# Patient Record
Sex: MALE | Race: OTHER | Employment: FULL TIME | URBAN - METROPOLITAN AREA
[De-identification: names, ages, dates, MRNs, and addresses within clinical notes are randomized per-mention and may not be internally consistent; named-entity substitution may affect disease eponyms.]

---

## 2017-01-03 ENCOUNTER — PATIENT OUTREACH (OUTPATIENT)
Dept: INTERNAL MEDICINE CLINIC | Facility: CLINIC | Age: 67
End: 2017-01-03

## 2017-02-24 ENCOUNTER — PATIENT OUTREACH (OUTPATIENT)
Dept: INTERNAL MEDICINE CLINIC | Facility: CLINIC | Age: 67
End: 2017-02-24

## 2017-03-07 NOTE — PROGRESS NOTES
Unable to reach pt at home phone and work number is general phone for [place]. [address] sent. PLAN:  Follow up 1 month.

## 2017-03-12 RX ORDER — ROSUVASTATIN CALCIUM 5 MG/1
TABLET, COATED ORAL
Qty: 90 TABLET | Refills: 3 | Status: SHIPPED | OUTPATIENT
Start: 2017-03-12 | End: 2018-02-14

## 2017-03-17 RX ORDER — VALSARTAN 320 MG/1
TABLET ORAL
Qty: 90 TABLET | Refills: 3 | Status: SHIPPED | OUTPATIENT
Start: 2017-03-17 | End: 2018-03-13

## 2017-10-04 RX ORDER — AMLODIPINE BESYLATE 10 MG/1
TABLET ORAL
Qty: 30 TABLET | Refills: 0 | Status: SHIPPED | OUTPATIENT
Start: 2017-10-04 | End: 2017-11-03

## 2017-10-14 ENCOUNTER — LAB ENCOUNTER (OUTPATIENT)
Dept: LAB | Age: 67
End: 2017-10-14
Attending: INTERNAL MEDICINE
Payer: COMMERCIAL

## 2017-10-14 ENCOUNTER — OFFICE VISIT (OUTPATIENT)
Dept: INTERNAL MEDICINE CLINIC | Facility: CLINIC | Age: 67
End: 2017-10-14

## 2017-10-14 VITALS
HEART RATE: 56 BPM | TEMPERATURE: 99 F | BODY MASS INDEX: 29 KG/M2 | DIASTOLIC BLOOD PRESSURE: 77 MMHG | WEIGHT: 214 LBS | SYSTOLIC BLOOD PRESSURE: 148 MMHG

## 2017-10-14 DIAGNOSIS — D22.9 ATYPICAL MOLE: Primary | ICD-10-CM

## 2017-10-14 DIAGNOSIS — E78.2 MIXED HYPERLIPIDEMIA: ICD-10-CM

## 2017-10-14 DIAGNOSIS — F31.9 BIPOLAR 1 DISORDER (HCC): ICD-10-CM

## 2017-10-14 DIAGNOSIS — E11.9 DIABETES MELLITUS TYPE 2 WITHOUT RETINOPATHY (HCC): ICD-10-CM

## 2017-10-14 DIAGNOSIS — B35.1 ONYCHOMYCOSIS: ICD-10-CM

## 2017-10-14 DIAGNOSIS — E87.1 HYPONATREMIA: ICD-10-CM

## 2017-10-14 PROCEDURE — 99212 OFFICE O/P EST SF 10 MIN: CPT | Performed by: INTERNAL MEDICINE

## 2017-10-14 PROCEDURE — 36415 COLL VENOUS BLD VENIPUNCTURE: CPT

## 2017-10-14 PROCEDURE — 80156 ASSAY CARBAMAZEPINE TOTAL: CPT

## 2017-10-14 PROCEDURE — 84443 ASSAY THYROID STIM HORMONE: CPT

## 2017-10-14 PROCEDURE — 82043 UR ALBUMIN QUANTITATIVE: CPT

## 2017-10-14 PROCEDURE — 85025 COMPLETE CBC W/AUTO DIFF WBC: CPT

## 2017-10-14 PROCEDURE — 82570 ASSAY OF URINE CREATININE: CPT

## 2017-10-14 PROCEDURE — 83036 HEMOGLOBIN GLYCOSYLATED A1C: CPT

## 2017-10-14 PROCEDURE — 80053 COMPREHEN METABOLIC PANEL: CPT

## 2017-10-14 PROCEDURE — 80061 LIPID PANEL: CPT

## 2017-10-14 PROCEDURE — 99214 OFFICE O/P EST MOD 30 MIN: CPT | Performed by: INTERNAL MEDICINE

## 2017-10-14 RX ORDER — CLOTRIMAZOLE AND BETAMETHASONE DIPROPIONATE 10; .64 MG/G; MG/G
1 CREAM TOPICAL 2 TIMES DAILY PRN
Qty: 60 G | Refills: 2 | Status: SHIPPED | OUTPATIENT
Start: 2017-10-14 | End: 2019-06-08

## 2017-10-14 NOTE — PROGRESS NOTES
Zahraa Lopez is a 74year old male.   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   Jose C Xavier he might need Disp:  Rfl:    ONETOUCH ULTRA BLUE In Vitro Strip TEST SUGAR EVERY DAY Disp: 100 strip Rfl: 3   Fluticasone Propionate (FLONASE) 50 MCG/ACT Nasal Suspension 1 spray i each nostril every 12 hours Disp: 1 Bottle Rfl: 2   CarBAMazepine ER, Antipsych, (EQUETRO , right foot thickened toenails but not as yellow, no ulcers or calluses ,sensations grossly intact     ASSESSMENT AND PLAN:   Diagnoses and all orders for this visit:    Atypical mole  -     DERM - INTERNAL    Diabetes mellitus type 2 without retinopathy Labs reviewed 7/16 and new ones ordered   Flu shot from outside facility per pt   Come back for physical     The patient indicates understanding of these issues and agrees to the plan. Return in about 2 weeks (around 10/28/2017).

## 2017-10-14 NOTE — PATIENT INSTRUCTIONS
Understanding Carbohydrates, Fats, and Protein  Food is a source of fuel and nourishment for your body. It’s also a source of pleasure. Having diabetes doesn’t mean you have to eat special foods or give up desserts.  Instead, your dietitian can show you h · Polyunsaturated fats are mostly found in vegetable oils, such as corn, safflower, and soybean oils. They are also found in some seeds, nuts, and fish. Polyunsaturated fats lower LDL (unhealthy) cholesterol.  So, choosing them instead of saturated fats is Food is an important tool that you can use to control diabetes and stay healthy. Eating well-balanced meals in the correct amounts will help you control your blood glucose levels and prevent low blood sugar reactions.  It will also help you reduce the healt · Avoid added salt. It can contribute to high blood pressure, which can cause heart disease. People with diabetes already have a risk of high blood pressure and heart disease. · Stay at a healthy weight.  If you need to lose weight, cut down on your portio

## 2017-10-18 ENCOUNTER — TELEPHONE (OUTPATIENT)
Dept: OTHER | Age: 67
End: 2017-10-18

## 2017-10-18 NOTE — TELEPHONE ENCOUNTER
Unable to contact pt. Phone keeps ringing and then goes to busy signal. Other ph# general directory # to [facility] no VM left. [employer] message sent to pt for possible contact.

## 2017-10-18 NOTE — TELEPHONE ENCOUNTER
----- Message from Juice Marinelli RN sent at 10/18/2017  6:24 AM CDT -----      ----- Message -----  From: Bhavin Morton MD  Sent: 10/17/2017   2:34 PM  To:  Mehreen Polanco Lpn/Victoriano    Please call patient and let them know that the labsoverall are within normal l

## 2017-11-04 RX ORDER — AMLODIPINE BESYLATE 10 MG/1
TABLET ORAL
Qty: 90 TABLET | Refills: 0 | Status: SHIPPED | OUTPATIENT
Start: 2017-11-04 | End: 2018-01-30

## 2017-11-04 NOTE — TELEPHONE ENCOUNTER
Hypertensive Medications  Protocol Criteria:  · Appointment scheduled in the past 6 months or in the next 3 months  · BMP or CMP in the past 12 months  · Creatinine result < 2  Recent Outpatient Visits            3 weeks ago Lilliam mole    Guido Flores

## 2018-01-23 ENCOUNTER — OFFICE VISIT (OUTPATIENT)
Dept: INTERNAL MEDICINE CLINIC | Facility: CLINIC | Age: 68
End: 2018-01-23

## 2018-01-23 ENCOUNTER — APPOINTMENT (OUTPATIENT)
Dept: LAB | Age: 68
End: 2018-01-23
Attending: INTERNAL MEDICINE
Payer: COMMERCIAL

## 2018-01-23 VITALS
BODY MASS INDEX: 29.12 KG/M2 | WEIGHT: 215 LBS | SYSTOLIC BLOOD PRESSURE: 164 MMHG | DIASTOLIC BLOOD PRESSURE: 82 MMHG | HEIGHT: 72 IN | HEART RATE: 63 BPM

## 2018-01-23 DIAGNOSIS — E11.9 DIABETES MELLITUS TYPE 2 WITHOUT RETINOPATHY (HCC): ICD-10-CM

## 2018-01-23 DIAGNOSIS — F31.9 BIPOLAR 1 DISORDER (HCC): ICD-10-CM

## 2018-01-23 DIAGNOSIS — Z23 NEED FOR VACCINATION: ICD-10-CM

## 2018-01-23 DIAGNOSIS — E78.2 MIXED HYPERLIPIDEMIA: Primary | ICD-10-CM

## 2018-01-23 DIAGNOSIS — I10 ESSENTIAL HYPERTENSION: ICD-10-CM

## 2018-01-23 LAB — HBA1C MFR BLD: 7.3 % (ref 4–6)

## 2018-01-23 PROCEDURE — 90670 PCV13 VACCINE IM: CPT | Performed by: INTERNAL MEDICINE

## 2018-01-23 PROCEDURE — 36415 COLL VENOUS BLD VENIPUNCTURE: CPT

## 2018-01-23 PROCEDURE — G0009 ADMIN PNEUMOCOCCAL VACCINE: HCPCS | Performed by: INTERNAL MEDICINE

## 2018-01-23 PROCEDURE — 99214 OFFICE O/P EST MOD 30 MIN: CPT | Performed by: INTERNAL MEDICINE

## 2018-01-23 PROCEDURE — 83036 HEMOGLOBIN GLYCOSYLATED A1C: CPT

## 2018-01-23 PROCEDURE — G0463 HOSPITAL OUTPT CLINIC VISIT: HCPCS | Performed by: INTERNAL MEDICINE

## 2018-01-23 NOTE — PATIENT INSTRUCTIONS
Understanding Carbohydrates, Fats, and Protein  Food is a source of fuel and nourishment for your body. It’s also a source of pleasure. Having diabetes doesn’t mean you have to eat special foods or give up desserts.  Instead, your dietitian can show you h · Polyunsaturated fats are mostly found in vegetable oils, such as corn, safflower, and soybean oils. They are also found in some seeds, nuts, and fish. Polyunsaturated fats lower LDL (unhealthy) cholesterol.  So, choosing them instead of saturated fats is
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
## 2018-02-02 RX ORDER — AMLODIPINE BESYLATE 10 MG/1
TABLET ORAL
Qty: 90 TABLET | Refills: 0 | Status: SHIPPED | OUTPATIENT
Start: 2018-02-02 | End: 2018-05-17

## 2018-02-03 NOTE — TELEPHONE ENCOUNTER
Hypertensive Medications: Refilled per protocol    Protocol Criteria:  · Appointment scheduled in the past 6 months or in the next 3 months  · BMP or CMP in the past 12 months  · Creatinine result < 2  Recent Outpatient Visits            1 week ago Mixed h

## 2018-02-15 RX ORDER — ROSUVASTATIN CALCIUM 5 MG/1
TABLET, COATED ORAL
Qty: 90 TABLET | Refills: 0 | Status: SHIPPED | OUTPATIENT
Start: 2018-02-15 | End: 2018-04-22

## 2018-02-15 NOTE — TELEPHONE ENCOUNTER
Medication refilled for 90 days per protocol.     Cholesterol Medications  Protocol Criteria:  · Appointment scheduled in the past 12 months or in the next 3 months  · ALT & LDL on file in the past 12 months  · ALT result < 80  · LDL result <130   Recent Ou

## 2018-02-19 ENCOUNTER — PATIENT OUTREACH (OUTPATIENT)
Dept: CASE MANAGEMENT | Age: 68
End: 2018-02-19

## 2018-02-19 NOTE — PROGRESS NOTES
Outreached to patient in regards to enrollment to Chronic Care Management program. Patient stated that he would like to talk to Dr. Teto Dewey first before enrolling into the program.  I informed patient I will mail out letter and follow up in 2 weeks.  Nicolas

## 2018-02-23 ENCOUNTER — OFFICE VISIT (OUTPATIENT)
Dept: INTERNAL MEDICINE CLINIC | Facility: CLINIC | Age: 68
End: 2018-02-23

## 2018-02-23 VITALS
DIASTOLIC BLOOD PRESSURE: 74 MMHG | HEART RATE: 57 BPM | SYSTOLIC BLOOD PRESSURE: 134 MMHG | WEIGHT: 212 LBS | BODY MASS INDEX: 28.71 KG/M2 | HEIGHT: 72 IN

## 2018-02-23 DIAGNOSIS — Z12.11 COLON CANCER SCREENING: ICD-10-CM

## 2018-02-23 DIAGNOSIS — Z12.5 ENCOUNTER FOR SCREENING FOR MALIGNANT NEOPLASM OF PROSTATE: ICD-10-CM

## 2018-02-23 DIAGNOSIS — D22.9 NUMEROUS MOLES: ICD-10-CM

## 2018-02-23 DIAGNOSIS — E11.9 DIABETES MELLITUS TYPE 2 WITHOUT RETINOPATHY (HCC): ICD-10-CM

## 2018-02-23 DIAGNOSIS — I10 ESSENTIAL HYPERTENSION: Primary | ICD-10-CM

## 2018-02-23 DIAGNOSIS — Z80.8 FHX: MELANOMA: ICD-10-CM

## 2018-02-23 PROCEDURE — G0463 HOSPITAL OUTPT CLINIC VISIT: HCPCS | Performed by: INTERNAL MEDICINE

## 2018-02-23 PROCEDURE — 99214 OFFICE O/P EST MOD 30 MIN: CPT | Performed by: INTERNAL MEDICINE
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
## 2018-02-23 NOTE — PATIENT INSTRUCTIONS
Diabetes: Shopping for and Preparing Meals    Having diabetes doesn’t mean you have to shop in a special aisle or look for special foods. But you will need to make choices.  By comparing items and reading food labels, you can find the healthiest foods for · Compare items and decide which is the best for your health needs. · Track the number of carbohydrates in your portions.   · Figure out how many servings of a food you can have and still stay within the number of carbohydrates for that meal.  Planning courtney · Instead of cream-based sauces or sugary glazes, flavor foods with vegetable purée, lemon or lime juice, or herb seasonings. · Remove skin from chicken and turkey before serving. · Look in cookbooks for easy, low-fat, low-sugar recipes.  When making your

## 2018-02-24 NOTE — H&P
[address] [facility] [address] Gastroenterology                                                                                                  Clinic History and Physical     Pa endoscopies:  April 2011 colonoscopy performed by Dr. Priyanka Lynch w/ IV Twilight r/t hx adenomatous colon, findings: multiple colon polyps removed, internal hemorrhoids, 3 year recall     Social Hx:  - No smoking  + occassional etoh  - Denies illicit drug 8 % External Solution Apply 1 Application topically 2 (two) times daily.  Applied to nails and surrounding skin twice a day, take off with alcohol after 1 week, do not shower 8 hours prior to use Disp: 1 Bottle Rfl: 1   VALSARTAN 320 MG Oral Tab TAKE 1 TABL quadrants without rigidity or guarding, non-distended, no abnormal bowel sounds noted, no masses are palpated  : no CVA tenderness  Skin: dry, warm, no jaundice  Ext: no cyanosis, clubbing or edema is evident.    Neuro: Alert and oriented x4, and patient as prescribed  -Diabetes and hypertensive meds: Continue HTN Rx as prescribed, hold Metformin day before/day of procedure     Colonoscopy consent: I have discussed the risks, benefits, and alternatives to colonoscopy with the patient [who demonstrated unde

## 2018-02-28 ENCOUNTER — OFFICE VISIT (OUTPATIENT)
Dept: GASTROENTEROLOGY | Facility: CLINIC | Age: 68
End: 2018-02-28

## 2018-02-28 ENCOUNTER — TELEPHONE (OUTPATIENT)
Dept: GASTROENTEROLOGY | Facility: CLINIC | Age: 68
End: 2018-02-28

## 2018-02-28 VITALS
WEIGHT: 214 LBS | HEART RATE: 55 BPM | HEIGHT: 71.5 IN | DIASTOLIC BLOOD PRESSURE: 77 MMHG | SYSTOLIC BLOOD PRESSURE: 164 MMHG | BODY MASS INDEX: 29.31 KG/M2

## 2018-02-28 DIAGNOSIS — K59.00 CONSTIPATION, UNSPECIFIED CONSTIPATION TYPE: ICD-10-CM

## 2018-02-28 DIAGNOSIS — Z86.010 HX OF ADENOMATOUS COLONIC POLYPS: Primary | ICD-10-CM

## 2018-02-28 PROCEDURE — 99203 OFFICE O/P NEW LOW 30 MIN: CPT | Performed by: NURSE PRACTITIONER

## 2018-02-28 PROCEDURE — G0463 HOSPITAL OUTPT CLINIC VISIT: HCPCS | Performed by: NURSE PRACTITIONER

## 2018-02-28 NOTE — TELEPHONE ENCOUNTER
Scheduled for:  Colonoscopy 18563  Provider Name: Dr. Amadou Jessica  Date:  4/17/18  Location:  [facility]  Sedation:  IV  Time:  2:30 pm, arrival 1:30 pm  Prep: Colyte  Meds/Allergies Reconciled?:  TOMMY Correa reviewed  3.  Continue all medications for procedure exc

## 2018-02-28 NOTE — PATIENT INSTRUCTIONS
1. Schedule colonoscopy with Dr. Oscar Cohen w/ IV Twilight    2.  bowel prep from pharmacy - split dose Colyte     3. Continue all medications for procedure except Metformin (hold day before/day of procedure)    4.  Read all bowel prep instructions

## 2018-03-12 ENCOUNTER — PATIENT OUTREACH (OUTPATIENT)
Dept: CASE MANAGEMENT | Age: 68
End: 2018-03-12

## 2018-03-12 NOTE — PROGRESS NOTES
Outreached to patient in regards to letter mailed for enrollment to Chronic Care Management program. Patient not available and not able to leave message,phone rings, and rings then disconnect. Thank you.

## 2018-03-13 RX ORDER — VALSARTAN 320 MG/1
320 TABLET ORAL
Qty: 90 TABLET | Refills: 0 | Status: SHIPPED | OUTPATIENT
Start: 2018-03-13 | End: 2018-07-10

## 2018-03-13 NOTE — TELEPHONE ENCOUNTER
Hypertensive Medications  Protocol Criteria:  · Appointment scheduled in the past 6 months or in the next 3 months  · BMP or CMP in the past 12 months  · Creatinine result < 2  Recent Outpatient Visits            1 week ago Hx of adenomatous colonic polyps

## 2018-03-26 ENCOUNTER — OFFICE VISIT (OUTPATIENT)
Dept: DERMATOLOGY CLINIC | Facility: CLINIC | Age: 68
End: 2018-03-26

## 2018-03-26 DIAGNOSIS — L71.9 ROSACEA: ICD-10-CM

## 2018-03-26 DIAGNOSIS — D23.4 BENIGN NEOPLASM OF SCALP AND SKIN OF NECK: ICD-10-CM

## 2018-03-26 DIAGNOSIS — L57.0 AK (ACTINIC KERATOSIS): Primary | ICD-10-CM

## 2018-03-26 DIAGNOSIS — D23.30 BENIGN NEOPLASM OF SKIN OF FACE: ICD-10-CM

## 2018-03-26 DIAGNOSIS — D23.60 BENIGN NEOPLASM OF SKIN OF UPPER LIMB, INCLUDING SHOULDER, UNSPECIFIED LATERALITY: ICD-10-CM

## 2018-03-26 DIAGNOSIS — D23.5 BENIGN NEOPLASM OF SKIN OF TRUNK, EXCEPT SCROTUM: ICD-10-CM

## 2018-03-26 PROCEDURE — 99202 OFFICE O/P NEW SF 15 MIN: CPT | Performed by: DERMATOLOGY

## 2018-03-26 PROCEDURE — 17000 DESTRUCT PREMALG LESION: CPT | Performed by: DERMATOLOGY

## 2018-04-08 NOTE — PROGRESS NOTES
Jolene Case II is a 79year old male. HPI:     CC:  Patient presents with:  Lesion: New pt (LOV:06/20/14).  Pt presents with several lesions of concern throughout body, pt requests a full body exam.  Pt has personal hx of AKs, family hx of melanoma i Application topically 2 (two) times daily. Applied to nails and surrounding skin twice a day, take off with alcohol after 1 week, do not shower 8 hours prior to use Disp: 1 Bottle Rfl: 1   aspirin 325 MG Oral Tab Take 325 mg by mouth daily.  Disp:  Rfl: Diabetes Brother    • Glaucoma Neg    • Macular degeneration Neg        There were no vitals filed for this visit. HPI:    Patient presents with:  Lesion: New pt (LOV:06/20/14).  Pt presents with several lesions of concern throughout body, pt requests a shoulder, unspecified laterality    See details on map. Remarkable for:    Erythematous scaling keratotic papules left anterior scalp more waxy tan keratotic papules at site of previous AK's at vertex, left parietal scalp    Actinic keratoses.   Stephen [place]

## 2018-04-13 ENCOUNTER — TELEPHONE (OUTPATIENT)
Dept: GASTROENTEROLOGY | Facility: CLINIC | Age: 68
End: 2018-04-13

## 2018-04-13 DIAGNOSIS — K59.00 CONSTIPATION, UNSPECIFIED CONSTIPATION TYPE: ICD-10-CM

## 2018-04-13 DIAGNOSIS — Z86.010 HISTORY OF ADENOMATOUS POLYP OF COLON: Primary | ICD-10-CM

## 2018-04-13 NOTE — TELEPHONE ENCOUNTER
CB could not LVM since it has not been set up yet to reschedule procedure.  Please transfer to [facility] in GI

## 2018-04-13 NOTE — TELEPHONE ENCOUNTER
Pt called to reschedule 4/17/18 colonoscopy to sometime after 5/14/18 due to transportation issues. Please call.

## 2018-04-16 NOTE — TELEPHONE ENCOUNTER
I spoke with this patient to reschedule his procedure but we could not find a date that fits with his schedule.  Patient w/CB

## 2018-04-16 NOTE — TELEPHONE ENCOUNTER
CB, unable to LM to reschedule procedure, no VM box set up. Will CB in a few hours. Please transfer to [facility] at ext [phone] or [phone] for scheduling. Or please transfer to [facility] in GI if unavailable.

## 2018-04-16 NOTE — TELEPHONE ENCOUNTER
Called this patient with no answer since is VM is not set up. Cancelling this procedure since we can not contact this patient and can not leave a message.     Cancelling for: Colonoscopy [id number]   Provider Name: Dr. Gianni Austin  Date:  4/17/18  Location:  [address] [place] [address]

## 2018-04-16 NOTE — TELEPHONE ENCOUNTER
Pt is returning Surgery Schedulers call. I tried to reach, but not avail., Pt. States that he does not know how to set up his vm., so he does not have vm.

## 2018-04-24 RX ORDER — ROSUVASTATIN CALCIUM 5 MG/1
TABLET, COATED ORAL
Qty: 90 TABLET | Refills: 0 | Status: SHIPPED | OUTPATIENT
Start: 2018-04-24 | End: 2018-05-18

## 2018-05-18 RX ORDER — AMLODIPINE BESYLATE 10 MG/1
TABLET ORAL
Qty: 90 TABLET | Refills: 0 | Status: SHIPPED | OUTPATIENT
Start: 2018-05-18 | End: 2018-07-10

## 2018-05-18 NOTE — TELEPHONE ENCOUNTER
Hypertensive Medications  Protocol Criteria:  · Appointment scheduled in the past 6 months or in the next 3 months  · BMP or CMP in the past 12 months  · Creatinine result < 2  Recent Outpatient Visits            1 month ago [facility]inic ker[facility]

## 2018-05-19 RX ORDER — ROSUVASTATIN CALCIUM 5 MG/1
TABLET, COATED ORAL
Qty: 90 TABLET | Refills: 0 | Status: SHIPPED | OUTPATIENT
Start: 2018-05-19 | End: 2018-07-10

## 2018-05-19 NOTE — TELEPHONE ENCOUNTER
Cholesterol Medication Protocol Passed  Cholesterol Medications  Protocol Criteria:  · Appointment scheduled in the past 12 months or in the next 3 months  · ALT & LDL on file in the past 12 months  · ALT result < 80  · LDL result <130   Recent Outpatient

## 2018-07-10 RX ORDER — AMLODIPINE BESYLATE 10 MG/1
10 TABLET ORAL
Qty: 90 TABLET | Refills: 0 | Status: SHIPPED | OUTPATIENT
Start: 2018-07-10 | End: 2018-09-13

## 2018-07-10 RX ORDER — ROSUVASTATIN CALCIUM 5 MG/1
5 TABLET, COATED ORAL
Qty: 90 TABLET | Refills: 0 | Status: SHIPPED | OUTPATIENT
Start: 2018-07-10 | End: 2018-09-13

## 2018-07-10 RX ORDER — VALSARTAN 320 MG/1
320 TABLET ORAL
Qty: 90 TABLET | Refills: 0 | Status: SHIPPED | OUTPATIENT
Start: 2018-07-10 | End: 2018-09-13

## 2018-07-10 NOTE — TELEPHONE ENCOUNTER
Pt is calling state that he requested twice through pharm for a 90 day refill on medication pt state that he is running out of his med    Current Outpatient Prescriptions:  ROSUVASTATIN CALCIUM 5 MG Oral Tab TAKE 1 TABLET BY MOUTH EVERY DAY Disp: 90 tablet

## 2018-07-11 NOTE — TELEPHONE ENCOUNTER
Cholesterol Medications  Protocol Criteria:  · Appointment scheduled in the past 12 months or in the next 3 months  · ALT & LDL on file in the past 12 months  · ALT result < 80  · LDL result <130   Recent Outpatient Visits            3 months ago AK (actin 10/14/2017   CA 9.4 10/14/2017   ALKPHOS 118 (H) 07/02/2016   AST 17 10/14/2017   ALT 23 10/14/2017   BILT 0.4 10/14/2017   TP 6.7 10/14/2017   ALB 4.0 10/14/2017    (L) 10/14/2017   K 4.6 10/14/2017   CL 98 10/14/2017   CO2 29 10/14/2017   GLOBULIN

## 2018-09-13 ENCOUNTER — LAB ENCOUNTER (OUTPATIENT)
Dept: LAB | Age: 68
End: 2018-09-13
Attending: INTERNAL MEDICINE
Payer: COMMERCIAL

## 2018-09-13 ENCOUNTER — OFFICE VISIT (OUTPATIENT)
Dept: INTERNAL MEDICINE CLINIC | Facility: CLINIC | Age: 68
End: 2018-09-13
Payer: COMMERCIAL

## 2018-09-13 VITALS
WEIGHT: 215 LBS | DIASTOLIC BLOOD PRESSURE: 76 MMHG | BODY MASS INDEX: 29.44 KG/M2 | HEIGHT: 71.5 IN | SYSTOLIC BLOOD PRESSURE: 136 MMHG | HEART RATE: 54 BPM

## 2018-09-13 DIAGNOSIS — I10 ESSENTIAL HYPERTENSION: ICD-10-CM

## 2018-09-13 DIAGNOSIS — E78.2 MIXED HYPERLIPIDEMIA: ICD-10-CM

## 2018-09-13 DIAGNOSIS — R35.1 NOCTURIA: ICD-10-CM

## 2018-09-13 DIAGNOSIS — E11.9 DIABETES MELLITUS TYPE 2 WITHOUT RETINOPATHY (HCC): ICD-10-CM

## 2018-09-13 DIAGNOSIS — I10 ESSENTIAL HYPERTENSION: Primary | ICD-10-CM

## 2018-09-13 LAB
ALBUMIN SERPL BCP-MCNC: 4.2 G/DL (ref 3.5–4.8)
ALBUMIN/GLOB SERPL: 1.4 {RATIO} (ref 1–2)
ALP SERPL-CCNC: 97 U/L (ref 32–100)
ALT SERPL-CCNC: 28 U/L (ref 17–63)
ANION GAP SERPL CALC-SCNC: 6 MMOL/L (ref 0–18)
AST SERPL-CCNC: 19 U/L (ref 15–41)
BASOPHILS # BLD: 0 K/UL (ref 0–0.2)
BASOPHILS NFR BLD: 0 %
BILIRUB SERPL-MCNC: 0.4 MG/DL (ref 0.3–1.2)
BUN SERPL-MCNC: 17 MG/DL (ref 8–20)
BUN/CREAT SERPL: 18.9 (ref 10–20)
CALCIUM SERPL-MCNC: 9.4 MG/DL (ref 8.5–10.5)
CHLORIDE SERPL-SCNC: 100 MMOL/L (ref 95–110)
CHOLEST SERPL-MCNC: 166 MG/DL (ref 110–200)
CO2 SERPL-SCNC: 28 MMOL/L (ref 22–32)
CREAT SERPL-MCNC: 0.9 MG/DL (ref 0.5–1.5)
CREAT UR-MCNC: 91.1 MG/DL
EOSINOPHIL # BLD: 0.2 K/UL (ref 0–0.7)
EOSINOPHIL NFR BLD: 3 %
ERYTHROCYTE [DISTWIDTH] IN BLOOD BY AUTOMATED COUNT: 12.8 % (ref 11–15)
GLOBULIN PLAS-MCNC: 2.9 G/DL (ref 2.5–3.7)
GLUCOSE SERPL-MCNC: 152 MG/DL (ref 70–99)
HBA1C MFR BLD: 7.1 % (ref 4–6)
HCT VFR BLD AUTO: 44 % (ref 41–52)
HDLC SERPL-MCNC: 36 MG/DL
HGB BLD-MCNC: 14.9 G/DL (ref 13.5–17.5)
LDLC SERPL CALC-MCNC: 103 MG/DL (ref 0–99)
LYMPHOCYTES # BLD: 1.4 K/UL (ref 1–4)
LYMPHOCYTES NFR BLD: 23 %
MCH RBC QN AUTO: 30.2 PG (ref 27–32)
MCHC RBC AUTO-ENTMCNC: 33.8 G/DL (ref 32–37)
MCV RBC AUTO: 89.3 FL (ref 80–100)
MICROALBUMIN UR-MCNC: 0.3 MG/DL (ref 0–1.8)
MICROALBUMIN/CREAT UR: 3.3 MG/G{CREAT} (ref 0–20)
MONOCYTES # BLD: 0.6 K/UL (ref 0–1)
MONOCYTES NFR BLD: 10 %
NEUTROPHILS # BLD AUTO: 3.7 K/UL (ref 1.8–7.7)
NEUTROPHILS NFR BLD: 63 %
NONHDLC SERPL-MCNC: 130 MG/DL
OSMOLALITY UR CALC.SUM OF ELEC: 283 MOSM/KG (ref 275–295)
PATIENT FASTING: YES
PLATELET # BLD AUTO: 244 K/UL (ref 140–400)
PMV BLD AUTO: 8.9 FL (ref 7.4–10.3)
POTASSIUM SERPL-SCNC: 4.6 MMOL/L (ref 3.3–5.1)
PROT SERPL-MCNC: 7.1 G/DL (ref 5.9–8.4)
PSA SERPL-MCNC: 2.1 NG/ML (ref 0–4)
RBC # BLD AUTO: 4.93 M/UL (ref 4.5–5.9)
SODIUM SERPL-SCNC: 134 MMOL/L (ref 136–144)
TRIGL SERPL-MCNC: 137 MG/DL (ref 1–149)
TSH SERPL-ACNC: 2.91 UIU/ML (ref 0.45–5.33)
WBC # BLD AUTO: 6 K/UL (ref 4–11)

## 2018-09-13 PROCEDURE — 80053 COMPREHEN METABOLIC PANEL: CPT

## 2018-09-13 PROCEDURE — 99214 OFFICE O/P EST MOD 30 MIN: CPT | Performed by: INTERNAL MEDICINE

## 2018-09-13 PROCEDURE — 36415 COLL VENOUS BLD VENIPUNCTURE: CPT

## 2018-09-13 PROCEDURE — 85025 COMPLETE CBC W/AUTO DIFF WBC: CPT

## 2018-09-13 PROCEDURE — 82043 UR ALBUMIN QUANTITATIVE: CPT

## 2018-09-13 PROCEDURE — 84443 ASSAY THYROID STIM HORMONE: CPT

## 2018-09-13 PROCEDURE — 99212 OFFICE O/P EST SF 10 MIN: CPT | Performed by: INTERNAL MEDICINE

## 2018-09-13 PROCEDURE — 83036 HEMOGLOBIN GLYCOSYLATED A1C: CPT

## 2018-09-13 PROCEDURE — 82570 ASSAY OF URINE CREATININE: CPT

## 2018-09-13 PROCEDURE — 80061 LIPID PANEL: CPT

## 2018-09-13 RX ORDER — VALSARTAN 320 MG/1
320 TABLET ORAL
Qty: 90 TABLET | Refills: 2 | Status: SHIPPED | OUTPATIENT
Start: 2018-09-13 | End: 2019-01-24

## 2018-09-13 RX ORDER — ROSUVASTATIN CALCIUM 5 MG/1
5 TABLET, COATED ORAL
Qty: 90 TABLET | Refills: 2 | Status: SHIPPED | OUTPATIENT
Start: 2018-09-13 | End: 2019-04-12

## 2018-09-13 RX ORDER — AMLODIPINE BESYLATE 10 MG/1
10 TABLET ORAL
Qty: 90 TABLET | Refills: 2 | Status: SHIPPED | OUTPATIENT
Start: 2018-09-13 | End: 2019-02-04 | Stop reason: ALTCHOICE

## 2018-09-13 NOTE — PROGRESS NOTES
Melyssa Rowan is a 79year old male.   Patient presents with:  Diabetes  HTN      HPI:   Pt comes for f/u  C/c dm htn   C/o dm  bl sugars ave closer to 150s but occ 125   Complains of nocturia about 3 times--not sure if he gets up to p.o. or if he gets Social History:  Social History    Tobacco Use      Smoking status: Never Smoker      Smokeless tobacco: Never Used    Alcohol use: Yes      Comment: Occasionally;     Drug use: No       REVIEW OF SYSTEMS:   GENERAL HEALTH: No fevers, chills, sweats and continue medications–refilled  Other orders  -     Rosuvastatin Calcium 5 MG Oral Tab; Take 1 tablet (5 mg total) by mouth once daily. -     valsartan 320 MG Oral Tab; Take 1 tablet (320 mg total) by mouth once daily.   -     AmLODIPine Besylate 10 MG

## 2019-01-07 DIAGNOSIS — E11.9 DIABETES MELLITUS TYPE 2 WITHOUT RETINOPATHY (HCC): ICD-10-CM

## 2019-01-14 ENCOUNTER — HOSPITAL ENCOUNTER (OUTPATIENT)
Age: 69
Discharge: HOME | End: 2019-01-14
Attending: EMERGENCY MEDICINE
Payer: COMMERCIAL

## 2019-01-14 ENCOUNTER — APPOINTMENT (OUTPATIENT)
Dept: GENERAL RADIOLOGY | Age: 69
End: 2019-01-14
Attending: EMERGENCY MEDICINE
Payer: COMMERCIAL

## 2019-01-14 VITALS
BODY MASS INDEX: 29 KG/M2 | TEMPERATURE: 98 F | DIASTOLIC BLOOD PRESSURE: 82 MMHG | OXYGEN SATURATION: 100 % | RESPIRATION RATE: 16 BRPM | HEART RATE: 68 BPM | WEIGHT: 210 LBS | SYSTOLIC BLOOD PRESSURE: 196 MMHG

## 2019-01-14 DIAGNOSIS — S92.515A CLOSED NONDISPLACED FRACTURE OF PROXIMAL PHALANX OF LESSER TOE OF LEFT FOOT, INITIAL ENCOUNTER: Primary | ICD-10-CM

## 2019-01-14 PROCEDURE — 99213 OFFICE O/P EST LOW 20 MIN: CPT

## 2019-01-14 PROCEDURE — 73630 X-RAY EXAM OF FOOT: CPT | Performed by: EMERGENCY MEDICINE

## 2019-01-14 PROCEDURE — 28510 TREATMENT OF TOE FRACTURE: CPT

## 2019-01-14 PROCEDURE — 99203 OFFICE O/P NEW LOW 30 MIN: CPT

## 2019-01-15 NOTE — ED PROVIDER NOTES
Patient Seen in: [facility] In [address] [place] [address]    History   Patient presents with:  Lower Extremity Injury (musculoskeletal)    Stated Complaint: left foot injury    HPI    HPI: Lucina Frankel is a 79year old male who presents after a Melanoma Mother    • Cancer Brother 39        Testicular cancer   • Heart Attack Brother 62        Myocardial infarction   • Diabetes Brother    • Glaucoma Neg    • Macular degeneration Neg        Social History    Tobacco Use      Smoking status: Never Sm possible for a visit in 1 week  If symptoms worsen    Manny Mustafa MD  [address] [phone]    Schedule an appointment as soon as possible for a visit         Medications Prescribed:  Current Discharge Medica

## 2019-01-15 NOTE — ED INITIAL ASSESSMENT (HPI)
Left foot injury this morning.  +bruising to the left 5th toe.  +swelling and pain to the top of the foot.

## 2019-01-24 ENCOUNTER — OFFICE VISIT (OUTPATIENT)
Dept: INTERNAL MEDICINE CLINIC | Facility: CLINIC | Age: 69
End: 2019-01-24
Payer: COMMERCIAL

## 2019-01-24 VITALS
HEIGHT: 71.5 IN | WEIGHT: 216 LBS | BODY MASS INDEX: 29.58 KG/M2 | HEART RATE: 64 BPM | SYSTOLIC BLOOD PRESSURE: 171 MMHG | DIASTOLIC BLOOD PRESSURE: 81 MMHG

## 2019-01-24 DIAGNOSIS — I10 ESSENTIAL HYPERTENSION: Primary | ICD-10-CM

## 2019-01-24 DIAGNOSIS — E11.9 DIABETES MELLITUS TYPE 2 WITHOUT RETINOPATHY (HCC): ICD-10-CM

## 2019-01-24 DIAGNOSIS — S92.515D CLOSED NONDISPLACED FRACTURE OF PROXIMAL PHALANX OF LESSER TOE OF LEFT FOOT WITH ROUTINE HEALING, SUBSEQUENT ENCOUNTER: ICD-10-CM

## 2019-01-24 DIAGNOSIS — E87.1 HYPONATREMIA: ICD-10-CM

## 2019-01-24 PROBLEM — S92.515A CLOSED NONDISPLACED FRACTURE OF PROXIMAL PHALANX OF LESSER TOE OF LEFT FOOT: Status: ACTIVE | Noted: 2019-01-24

## 2019-01-24 PROCEDURE — 99212 OFFICE O/P EST SF 10 MIN: CPT | Performed by: INTERNAL MEDICINE

## 2019-01-24 PROCEDURE — 99214 OFFICE O/P EST MOD 30 MIN: CPT | Performed by: INTERNAL MEDICINE

## 2019-01-24 RX ORDER — VALSARTAN AND HYDROCHLOROTHIAZIDE 320; 12.5 MG/1; MG/1
1 TABLET, FILM COATED ORAL DAILY
Qty: 90 TABLET | Refills: 3 | Status: SHIPPED | OUTPATIENT
Start: 2019-01-24 | End: 2019-02-06
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
## 2019-02-04 ENCOUNTER — OFFICE VISIT (OUTPATIENT)
Dept: INTERNAL MEDICINE CLINIC | Facility: CLINIC | Age: 69
End: 2019-02-04
Payer: COMMERCIAL

## 2019-02-04 VITALS
DIASTOLIC BLOOD PRESSURE: 77 MMHG | TEMPERATURE: 99 F | HEART RATE: 64 BPM | BODY MASS INDEX: 29.58 KG/M2 | HEIGHT: 71.5 IN | SYSTOLIC BLOOD PRESSURE: 187 MMHG | OXYGEN SATURATION: 98 % | WEIGHT: 216 LBS

## 2019-02-04 DIAGNOSIS — I10 ESSENTIAL HYPERTENSION: ICD-10-CM

## 2019-02-04 DIAGNOSIS — R05.9 COUGH: ICD-10-CM

## 2019-02-04 DIAGNOSIS — J02.9 PHARYNGITIS, UNSPECIFIED ETIOLOGY: Primary | ICD-10-CM

## 2019-02-04 PROCEDURE — 99212 OFFICE O/P EST SF 10 MIN: CPT | Performed by: INTERNAL MEDICINE

## 2019-02-04 PROCEDURE — 99214 OFFICE O/P EST MOD 30 MIN: CPT | Performed by: INTERNAL MEDICINE

## 2019-02-04 RX ORDER — AZITHROMYCIN 250 MG/1
TABLET, FILM COATED ORAL
Qty: 6 TABLET | Refills: 0 | Status: SHIPPED | OUTPATIENT
Start: 2019-02-04 | End: 2019-03-07

## 2019-02-04 RX ORDER — BENZONATATE 100 MG/1
100 CAPSULE ORAL 2 TIMES DAILY PRN
Qty: 10 CAPSULE | Refills: 0 | Status: SHIPPED | OUTPATIENT
Start: 2019-02-04 | End: 2019-03-07

## 2019-02-04 NOTE — PROGRESS NOTES
Zeynep Butler is a 79year old male.   Patient presents with:  Flu: chills, fever, cough, burning sensation in chest  started Thursday       HPI:   Pt comes as an urgent visit   C/c was sent home from work today   C/o sinus issues   Wed last week went daily. Disp:  Rfl:    ONETOUCH ULTRA BLUE In Vitro Strip TEST SUGAR EVERY DAY Disp: 100 strip Rfl: 3   CarBAMazepine ER, Antipsych, (EQUETRO) 200 MG Oral Capsule SR 12 Hr Take 2 capsules by mouth every evening.    Disp:  Rfl:       Past Medical History:   D visit:    Pharyngitis, unspecified etiology  -     azithromycin 250 MG Oral Tab; Take two tablets by mouth today, then one daily.    Can try claritin 10mg daily -- advsied to stay off work for the rest of the week as he is very fatigued and was told not to

## 2019-02-06 ENCOUNTER — OFFICE VISIT (OUTPATIENT)
Dept: INTERNAL MEDICINE CLINIC | Facility: CLINIC | Age: 69
End: 2019-02-06
Payer: COMMERCIAL

## 2019-02-06 ENCOUNTER — PATIENT MESSAGE (OUTPATIENT)
Dept: INTERNAL MEDICINE CLINIC | Facility: CLINIC | Age: 69
End: 2019-02-06

## 2019-02-06 ENCOUNTER — TELEPHONE (OUTPATIENT)
Dept: OTHER | Age: 69
End: 2019-02-06

## 2019-02-06 VITALS
SYSTOLIC BLOOD PRESSURE: 182 MMHG | WEIGHT: 216 LBS | HEART RATE: 60 BPM | BODY MASS INDEX: 29.58 KG/M2 | HEIGHT: 71.5 IN | DIASTOLIC BLOOD PRESSURE: 88 MMHG

## 2019-02-06 DIAGNOSIS — I10 ESSENTIAL HYPERTENSION: Primary | ICD-10-CM

## 2019-02-06 PROCEDURE — 99212 OFFICE O/P EST SF 10 MIN: CPT | Performed by: INTERNAL MEDICINE

## 2019-02-06 PROCEDURE — 99213 OFFICE O/P EST LOW 20 MIN: CPT | Performed by: INTERNAL MEDICINE

## 2019-02-06 RX ORDER — NIFEDIPINE 60 MG/1
60 TABLET, FILM COATED, EXTENDED RELEASE ORAL DAILY
Qty: 30 TABLET | Refills: 0 | Status: SHIPPED | OUTPATIENT
Start: 2019-02-06 | End: 2019-02-07 | Stop reason: SINTOL

## 2019-02-06 RX ORDER — VALSARTAN AND HYDROCHLOROTHIAZIDE 320; 25 MG/1; MG/1
1 TABLET, FILM COATED ORAL DAILY
Qty: 90 TABLET | Refills: 3 | Status: SHIPPED | OUTPATIENT
Start: 2019-02-06 | End: 2019-04-12

## 2019-02-06 NOTE — TELEPHONE ENCOUNTER
Patient states he sent email to update Dr. Nata Traylor about blood pressure. Patient stated he was started on some new blood pressure medication on 02/04/19 and it is not helping.    Patient requesting appointment with Dr. Nata Traylor.   Patient scheduled to see
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
## 2019-02-06 NOTE — TELEPHONE ENCOUNTER
Please call patient. From: Andriy Stevenson II  To: Kendra Hurst MD  Sent: 2/6/2019 12:01 PM CST  Subject: Other     Got new BP monitor. Current readings now ~165/75 stabilize from much higher on new medication.  Should I increase frequency of dosage, d

## 2019-02-07 NOTE — PROGRESS NOTES
Called and spoke to pt -- the nefidpine wasnot refilled by pharm due to interaction with the carbamazepine -- he did take the valsartan/hctz -- will take cardizem until he sees the psychiastrist tomorrow am and will call me with an update   Noted he was on

## 2019-02-07 NOTE — TELEPHONE ENCOUNTER
From: Anabela Zamora II  To: Parish Chavez MD  Sent: 2/6/2019 10:27 PM CST  Subject: Prescription Question    [facility]. Valsartan with increased HCTZ was filled. Problem is apparently interaction with tegretol. Please advise.

## 2019-02-08 ENCOUNTER — APPOINTMENT (OUTPATIENT)
Dept: LAB | Age: 69
End: 2019-02-08
Attending: INTERNAL MEDICINE
Payer: COMMERCIAL

## 2019-02-08 ENCOUNTER — TELEPHONE (OUTPATIENT)
Dept: OTHER | Age: 69
End: 2019-02-08

## 2019-02-08 ENCOUNTER — TELEPHONE (OUTPATIENT)
Dept: INTERNAL MEDICINE CLINIC | Facility: CLINIC | Age: 69
End: 2019-02-08

## 2019-02-08 DIAGNOSIS — E87.1 HYPONATREMIA: ICD-10-CM

## 2019-02-08 DIAGNOSIS — E11.9 DIABETES MELLITUS TYPE 2 WITHOUT RETINOPATHY (HCC): ICD-10-CM

## 2019-02-08 LAB
ALBUMIN SERPL BCP-MCNC: 4.2 G/DL (ref 3.5–4.8)
ALBUMIN/GLOB SERPL: 1.6 {RATIO} (ref 1–2)
ALP SERPL-CCNC: 106 U/L (ref 32–100)
ALT SERPL-CCNC: 31 U/L (ref 17–63)
ANION GAP SERPL CALC-SCNC: 12 MMOL/L (ref 0–18)
AST SERPL-CCNC: 22 U/L (ref 15–41)
BILIRUB SERPL-MCNC: 0.5 MG/DL (ref 0.3–1.2)
BUN SERPL-MCNC: 11 MG/DL (ref 8–20)
BUN/CREAT SERPL: 15.1 (ref 10–20)
CALCIUM SERPL-MCNC: 9.7 MG/DL (ref 8.5–10.5)
CHLORIDE SERPL-SCNC: 92 MMOL/L (ref 95–110)
CO2 SERPL-SCNC: 25 MMOL/L (ref 22–32)
CREAT SERPL-MCNC: 0.73 MG/DL (ref 0.5–1.5)
EST. AVERAGE GLUCOSE BLD GHB EST-MCNC: 143 MG/DL (ref 68–126)
GLOBULIN PLAS-MCNC: 2.7 G/DL (ref 2.5–3.7)
GLUCOSE SERPL-MCNC: 122 MG/DL (ref 70–99)
HBA1C MFR BLD HPLC: 6.6 % (ref ?–5.7)
OSMOLALITY UR CALC.SUM OF ELEC: 269 MOSM/KG (ref 275–295)
PATIENT FASTING: YES
POTASSIUM SERPL-SCNC: 4.1 MMOL/L (ref 3.3–5.1)
PROT SERPL-MCNC: 6.9 G/DL (ref 5.9–8.4)
SODIUM SERPL-SCNC: 129 MMOL/L (ref 136–144)

## 2019-02-08 PROCEDURE — 83036 HEMOGLOBIN GLYCOSYLATED A1C: CPT

## 2019-02-08 PROCEDURE — 80053 COMPREHEN METABOLIC PANEL: CPT

## 2019-02-08 PROCEDURE — 36415 COLL VENOUS BLD VENIPUNCTURE: CPT

## 2019-02-08 NOTE — TELEPHONE ENCOUNTER
Sunshine Quiñones, [address] [facility] 2/7/19             Called and spoke to pt -- the nefidpine wasnot refilled by pharm due to interaction with the carbamazepine -- he did take the valsartan/hctz -- will take cardizem until he sees the psychiastrist tomorrow am and

## 2019-02-08 NOTE — TELEPHONE ENCOUNTER
Patient returned call to follow up with Dr FERNANDES, did see his psychiatrist yesterday.  They will be stopping his Tegretol and he will now be taking Trileptal. Patient confirmed currently taking Valsartan-HCTZ and his Diltiazem, once Nifedipine is approved from

## 2019-02-08 NOTE — TELEPHONE ENCOUNTER
Called and spoke ot pt --   changed to trileptal and will go get nifedipine and cont  valsatan hctz   Just fygreyson

## 2019-02-08 NOTE — TELEPHONE ENCOUNTER
Pt calling to state his psychiatrist changed his meds to Trileptal. (pt was on tegretol and interaction with Nifedipine, See 2/6/19 my chart encounter)    Nifedipine does not have adverse interaction with Trileptal per [facility] pharmacist.    Will inform MD.

## 2019-02-08 NOTE — TELEPHONE ENCOUNTER
Called and spoke to pt --2/8/19-- psychiatrist changed to trileptal and will go get nifedipine and valsatan hctz

## 2019-02-09 ENCOUNTER — TELEPHONE (OUTPATIENT)
Dept: OTHER | Age: 69
End: 2019-02-09

## 2019-02-09 DIAGNOSIS — E87.1 HYPONATREMIA: Primary | ICD-10-CM

## 2019-02-09 NOTE — TELEPHONE ENCOUNTER
Spoke with pt and he stated he takes nifedifine and valsartan hctz, his BP is getting better, his reading is 142/70, 145/70. Pt stated his been off work for 9 days now and would like to go back. Pt req a RTW letter on 2-11 ( Monday).    pls advise, thank

## 2019-02-09 NOTE — TELEPHONE ENCOUNTER
seesin his wife right now --will give her the letter to give pt -- she is the one who brought this up that he has been trying to call and when I looked into the msg is when I saw this so will give her the letter

## 2019-02-23 DIAGNOSIS — E11.9 DIABETES MELLITUS TYPE 2 WITHOUT RETINOPATHY (HCC): ICD-10-CM

## 2019-02-24 NOTE — TELEPHONE ENCOUNTER
Refill passed per [facility] protocol.   Diabetes Medications  Protocol Criteria:  · Appointment scheduled in the past 6 months or the next 3 months  · A1C < 7.5 in the past 6 months  · Creatinine in the past 12 months  · Creatinine result < 1.5   Rece

## 2019-02-27 DIAGNOSIS — I10 ESSENTIAL HYPERTENSION: ICD-10-CM

## 2019-02-27 NOTE — TELEPHONE ENCOUNTER
Pharmacy requesting refill for patient on  DILTIAZEM 30 MG QTY 90 DAY SUPPLY  DIRECTIONS: TAKE 1 TAB BY MOUTH 2 TIMES DAILY

## 2019-03-03 DIAGNOSIS — I10 ESSENTIAL HYPERTENSION: ICD-10-CM

## 2019-03-04 RX ORDER — NIFEDIPINE 60 MG/1
60 TABLET, FILM COATED, EXTENDED RELEASE ORAL DAILY
Qty: 30 TABLET | Refills: 2 | Status: SHIPPED | OUTPATIENT
Start: 2019-03-04 | End: 2019-03-05

## 2019-03-04 NOTE — TELEPHONE ENCOUNTER
Review chart and as per chart and most recent telephone encounter he is on nifedipine and valsatan hctz --pls call pt to confirm --ty

## 2019-03-04 NOTE — TELEPHONE ENCOUNTER
Pt no longer taking dilitiazem but needs refill of nefedipine.     Hypertensive Medications  Protocol Criteria:  · Appointment scheduled in the past 6 months or in the next 3 months  · BMP or CMP in the past 12 months  · Creatinine result < 2  Recent Outpat

## 2019-03-05 DIAGNOSIS — I10 ESSENTIAL HYPERTENSION: ICD-10-CM

## 2019-03-06 RX ORDER — NIFEDIPINE 60 MG/1
TABLET, FILM COATED, EXTENDED RELEASE ORAL
Qty: 30 TABLET | Refills: 2 | Status: SHIPPED | OUTPATIENT
Start: 2019-03-06 | End: 2019-06-08

## 2019-03-07 ENCOUNTER — OFFICE VISIT (OUTPATIENT)
Dept: INTERNAL MEDICINE CLINIC | Facility: CLINIC | Age: 69
End: 2019-03-07
Payer: COMMERCIAL

## 2019-03-07 VITALS
HEIGHT: 71.5 IN | BODY MASS INDEX: 28.62 KG/M2 | WEIGHT: 209 LBS | DIASTOLIC BLOOD PRESSURE: 70 MMHG | SYSTOLIC BLOOD PRESSURE: 134 MMHG | HEART RATE: 65 BPM

## 2019-03-07 DIAGNOSIS — F31.9 BIPOLAR 1 DISORDER (HCC): ICD-10-CM

## 2019-03-07 DIAGNOSIS — E87.1 HYPONATREMIA: ICD-10-CM

## 2019-03-07 DIAGNOSIS — I10 ESSENTIAL HYPERTENSION: Primary | ICD-10-CM

## 2019-03-07 DIAGNOSIS — E11.9 DIABETES MELLITUS TYPE 2 WITHOUT RETINOPATHY (HCC): ICD-10-CM

## 2019-03-07 PROCEDURE — 99214 OFFICE O/P EST MOD 30 MIN: CPT | Performed by: INTERNAL MEDICINE

## 2019-03-07 PROCEDURE — 99212 OFFICE O/P EST SF 10 MIN: CPT | Performed by: INTERNAL MEDICINE

## 2019-03-07 RX ORDER — OXCARBAZEPINE 150 MG/1
150 TABLET, FILM COATED ORAL
COMMUNITY
End: 2019-06-08

## 2019-03-07 NOTE — PROGRESS NOTES
Jeromy Ospina is a 81year old male.   Patient presents with:  HTN: follow up      HPI:   Pt comes for f/u  C/c htn  C/o bp is better -- at home is not higher than 130s at home   +stress -- at work        History 1/24/19   His doctor took him off of th Social History:  Social History    Tobacco Use      Smoking status: Never Smoker      Smokeless tobacco: Never Used    Alcohol use: Yes      Comment: Occasionally;     Drug use: No       REVIEW OF SYSTEMS:   GENERAL HEALTH: No fevers, chills, sweats, + f shot at work 10/18   Labs 9/18 and 2/19 reviewed          The patient indicates understanding of these issues and agrees to the plan. No Follow-up on file.

## 2019-03-30 ENCOUNTER — APPOINTMENT (OUTPATIENT)
Dept: LAB | Age: 69
End: 2019-03-30
Attending: INTERNAL MEDICINE
Payer: COMMERCIAL

## 2019-03-30 DIAGNOSIS — E87.1 HYPONATREMIA: ICD-10-CM

## 2019-03-30 PROCEDURE — 80053 COMPREHEN METABOLIC PANEL: CPT

## 2019-03-30 PROCEDURE — 36415 COLL VENOUS BLD VENIPUNCTURE: CPT

## 2019-04-04 DIAGNOSIS — R74.8 ELEVATED LIVER ENZYMES: Primary | ICD-10-CM

## 2019-04-04 NOTE — PROGRESS NOTES
Call patient at [phone] box not set up  [phone]--no answer and no voicemail  Call patient to let him know that he needs repeat blood work done and this has been ordered--this is due to elevated liver enzymes--will ask nurses to fol

## 2019-04-06 ENCOUNTER — APPOINTMENT (OUTPATIENT)
Dept: LAB | Age: 69
End: 2019-04-06
Attending: INTERNAL MEDICINE
Payer: COMMERCIAL

## 2019-04-06 DIAGNOSIS — R74.8 ELEVATED LIVER ENZYMES: ICD-10-CM

## 2019-04-06 PROCEDURE — 36415 COLL VENOUS BLD VENIPUNCTURE: CPT

## 2019-04-06 PROCEDURE — 80053 COMPREHEN METABOLIC PANEL: CPT

## 2019-04-12 ENCOUNTER — OFFICE VISIT (OUTPATIENT)
Dept: INTERNAL MEDICINE CLINIC | Facility: CLINIC | Age: 69
End: 2019-04-12
Payer: COMMERCIAL

## 2019-04-12 VITALS
SYSTOLIC BLOOD PRESSURE: 136 MMHG | HEART RATE: 71 BPM | DIASTOLIC BLOOD PRESSURE: 60 MMHG | RESPIRATION RATE: 16 BRPM | WEIGHT: 199 LBS | HEIGHT: 71.5 IN | TEMPERATURE: 98 F | BODY MASS INDEX: 27.25 KG/M2

## 2019-04-12 DIAGNOSIS — R74.8 ELEVATED LIVER ENZYMES: ICD-10-CM

## 2019-04-12 DIAGNOSIS — I10 ESSENTIAL HYPERTENSION: Primary | ICD-10-CM

## 2019-04-12 DIAGNOSIS — Z12.11 COLON CANCER SCREENING: ICD-10-CM

## 2019-04-12 DIAGNOSIS — E78.2 MIXED HYPERLIPIDEMIA: ICD-10-CM

## 2019-04-12 PROCEDURE — 99214 OFFICE O/P EST MOD 30 MIN: CPT | Performed by: INTERNAL MEDICINE

## 2019-04-12 PROCEDURE — 99212 OFFICE O/P EST SF 10 MIN: CPT | Performed by: INTERNAL MEDICINE

## 2019-04-12 RX ORDER — OXCARBAZEPINE 300 MG/1
TABLET, FILM COATED ORAL
Refills: 0 | COMMUNITY
Start: 2019-03-14 | End: 2019-06-08

## 2019-04-12 RX ORDER — AMLODIPINE BESYLATE 10 MG/1
10 TABLET ORAL
Refills: 2 | COMMUNITY
Start: 2019-03-26 | End: 2019-04-12

## 2019-04-12 RX ORDER — VALSARTAN AND HYDROCHLOROTHIAZIDE 320; 25 MG/1; MG/1
1 TABLET, FILM COATED ORAL DAILY
Qty: 90 TABLET | Refills: 3 | Status: SHIPPED | OUTPATIENT
Start: 2019-04-12 | End: 2020-01-22

## 2019-04-12 NOTE — PATIENT INSTRUCTIONS
Low-Salt Choices  Eating salt (sodium) can make your body retain too much water. Excess water makes your heart work harder. Canned, packaged, and frozen foods are easy to prepare. But they are often high in sodium.  Here are some ideas for low-salt foods Eating has a big impact on your heart health. In fact, eating healthier can improve several of your heart risks at once. For instance, it helps you manage weight, cholesterol, and blood pressure.  Here are ideas to help you make heart-healthy changes withou Aim to make these foods staples of your diet. If you have diabetes, you may have different recommendations than what is listed here:  · Fruits and vegetables provide plenty of nutrients without a lot of calories.  At meals, fill half your plate with these f · Choose ingredients that spice up your food without adding calories, fat, or sodium. Try these items: horseradish, hot sauce, lemon, mustard, nonfat salad dressings, and vinegar.  For salt-free herbs and spices, try basil, cilantro, cinnamon, pepper, and r

## 2019-04-12 NOTE — PROGRESS NOTES
Sonam Crane is a 81year old male. Patient presents with:   Follow - Up      HPI:   Pt comes for f/u  C/c htn and LFTs  C/o not sure why hte LFTs went up -- after starting the trileptal -- anxiety dreams , constipation ,   Thinking its from the meds (two) times daily as needed. Disp: 60 g Rfl: 2   Multiple Vitamins-Minerals (MULTI-VITAMIN/MINERALS) Oral Tab Take 1 tablet by mouth daily.  Disp:  Rfl:       Past Medical History:   Diagnosis Date   • Bipolar 1 disorder ([facility]    • Diabetes ([facility]    • Other daily.  -     COMP METABOLIC PANEL (14); Future  -     CBC WITH DIFFERENTIAL WITH PLATELET;  Future  -     ASSAY, THYROID STIM HORMONE; Future  Advised pt to follow a low salt , low sodium (including fast foods and processed foods), can look up DASH diet, e

## 2019-04-27 ENCOUNTER — LAB ENCOUNTER (OUTPATIENT)
Dept: LAB | Age: 69
End: 2019-04-27
Attending: INTERNAL MEDICINE
Payer: COMMERCIAL

## 2019-04-27 DIAGNOSIS — R74.8 ELEVATED LIVER ENZYMES: ICD-10-CM

## 2019-04-27 DIAGNOSIS — I10 ESSENTIAL HYPERTENSION: ICD-10-CM

## 2019-04-27 PROCEDURE — 87389 HIV-1 AG W/HIV-1&-2 AB AG IA: CPT

## 2019-04-27 PROCEDURE — 86803 HEPATITIS C AB TEST: CPT

## 2019-04-27 PROCEDURE — 84443 ASSAY THYROID STIM HORMONE: CPT

## 2019-04-27 PROCEDURE — 85025 COMPLETE CBC W/AUTO DIFF WBC: CPT

## 2019-04-27 PROCEDURE — 80053 COMPREHEN METABOLIC PANEL: CPT

## 2019-04-27 PROCEDURE — 87340 HEPATITIS B SURFACE AG IA: CPT

## 2019-04-27 PROCEDURE — 36415 COLL VENOUS BLD VENIPUNCTURE: CPT

## 2019-05-21 RX ORDER — ROSUVASTATIN CALCIUM 5 MG/1
TABLET, COATED ORAL
Qty: 90 TABLET | Refills: 2 | OUTPATIENT
Start: 2019-05-21

## 2019-05-28 DIAGNOSIS — R74.8 ELEVATED LIVER ENZYMES: Primary | ICD-10-CM

## 2019-05-28 RX ORDER — ROSUVASTATIN CALCIUM 5 MG/1
TABLET, COATED ORAL
Qty: 90 TABLET | Refills: 2 | OUTPATIENT
Start: 2019-05-28

## 2019-05-28 NOTE — TELEPHONE ENCOUNTER
We can recheck his liver enzymes prior to restarting the cholesterol medications--please let him know that the blood work is ordered and he can get this done--no need to be fasting please asked him to call back once--he gets this done so that we can determ

## 2019-05-29 NOTE — TELEPHONE ENCOUNTER
Patient contacted (Name and  of pt verified). Message reviewed and recommendations reviewed. Patient verbalizes understanding, denies further questions and agrees with plan of care.

## 2019-05-30 DIAGNOSIS — I10 ESSENTIAL HYPERTENSION: ICD-10-CM

## 2019-05-30 RX ORDER — NIFEDIPINE 60 MG/1
TABLET, FILM COATED, EXTENDED RELEASE ORAL
Qty: 90 TABLET | Refills: 1 | Status: SHIPPED | OUTPATIENT
Start: 2019-05-30 | End: 2019-06-08

## 2019-06-01 ENCOUNTER — APPOINTMENT (OUTPATIENT)
Dept: LAB | Age: 69
End: 2019-06-01
Attending: INTERNAL MEDICINE
Payer: COMMERCIAL

## 2019-06-01 DIAGNOSIS — R74.8 ELEVATED LIVER ENZYMES: ICD-10-CM

## 2019-06-01 PROCEDURE — 36415 COLL VENOUS BLD VENIPUNCTURE: CPT

## 2019-06-01 PROCEDURE — 80053 COMPREHEN METABOLIC PANEL: CPT

## 2019-06-06 DIAGNOSIS — E11.9 DIABETES MELLITUS TYPE 2 WITHOUT RETINOPATHY (HCC): ICD-10-CM

## 2019-06-08 ENCOUNTER — OFFICE VISIT (OUTPATIENT)
Dept: INTERNAL MEDICINE CLINIC | Facility: CLINIC | Age: 69
End: 2019-06-08
Payer: COMMERCIAL

## 2019-06-08 ENCOUNTER — APPOINTMENT (OUTPATIENT)
Dept: LAB | Age: 69
End: 2019-06-08
Attending: INTERNAL MEDICINE
Payer: COMMERCIAL

## 2019-06-08 VITALS
HEIGHT: 71.5 IN | BODY MASS INDEX: 27.11 KG/M2 | HEART RATE: 57 BPM | DIASTOLIC BLOOD PRESSURE: 80 MMHG | WEIGHT: 198 LBS | SYSTOLIC BLOOD PRESSURE: 135 MMHG

## 2019-06-08 DIAGNOSIS — E78.2 MIXED HYPERLIPIDEMIA: Primary | ICD-10-CM

## 2019-06-08 DIAGNOSIS — B35.1 ONYCHOMYCOSIS: ICD-10-CM

## 2019-06-08 DIAGNOSIS — Z12.11 COLON CANCER SCREENING: ICD-10-CM

## 2019-06-08 DIAGNOSIS — E55.9 VITAMIN D DEFICIENCY: ICD-10-CM

## 2019-06-08 DIAGNOSIS — I10 ESSENTIAL HYPERTENSION: ICD-10-CM

## 2019-06-08 DIAGNOSIS — E11.9 DIABETES MELLITUS TYPE 2 WITHOUT RETINOPATHY (HCC): ICD-10-CM

## 2019-06-08 PROCEDURE — 82043 UR ALBUMIN QUANTITATIVE: CPT

## 2019-06-08 PROCEDURE — 82570 ASSAY OF URINE CREATININE: CPT

## 2019-06-08 PROCEDURE — 99214 OFFICE O/P EST MOD 30 MIN: CPT | Performed by: INTERNAL MEDICINE

## 2019-06-08 PROCEDURE — 36415 COLL VENOUS BLD VENIPUNCTURE: CPT

## 2019-06-08 PROCEDURE — 83036 HEMOGLOBIN GLYCOSYLATED A1C: CPT

## 2019-06-08 PROCEDURE — 82306 VITAMIN D 25 HYDROXY: CPT

## 2019-06-08 PROCEDURE — 99212 OFFICE O/P EST SF 10 MIN: CPT | Performed by: INTERNAL MEDICINE

## 2019-06-08 RX ORDER — AMOXICILLIN 500 MG/1
CAPSULE ORAL
Refills: 0 | COMMUNITY
Start: 2019-06-01 | End: 2020-02-10

## 2019-06-08 RX ORDER — CLOTRIMAZOLE AND BETAMETHASONE DIPROPIONATE 10; .64 MG/G; MG/G
1 CREAM TOPICAL 2 TIMES DAILY PRN
Qty: 60 G | Refills: 2 | Status: SHIPPED | OUTPATIENT
Start: 2019-06-08

## 2019-06-08 RX ORDER — NIFEDIPINE 60 MG/1
60 TABLET, FILM COATED, EXTENDED RELEASE ORAL
Qty: 90 TABLET | Refills: 1 | Status: SHIPPED | OUTPATIENT
Start: 2019-06-08 | End: 2020-02-06

## 2019-06-08 RX ORDER — ROSUVASTATIN CALCIUM 5 MG/1
5 TABLET, COATED ORAL NIGHTLY
Qty: 90 TABLET | Refills: 0 | Status: SHIPPED | OUTPATIENT
Start: 2019-06-08 | End: 2019-09-06

## 2019-06-08 NOTE — PATIENT INSTRUCTIONS
Eating for your heart doesn’t have to be hard or boring. You just need to know how to make healthier choices. The DASH eating plan has been developed to help you do just that. DASH stands for Dietary Approaches to Stop Hypertension.  It is a plan that has b visible fat. Broil, grill, roast, or boil instead of frying. Remove skin from poultry before eating.  Limit how much red meat you eat.  Nuts, seeds, beans  Servings: 4 to 5 a week  A serving is:  · One-third cup nuts (one and a half ounces)  · 2 tablespoons salt  Stay away from:  · Sausage, merino, and ham  · Flour tortillas  · Packaged muffins, pancakes, and biscuits  · Instant hot cereals  · Cottage cheese  For lunch and dinner  · Fresh fish, chicken, turkey, or meat—baked, broiled, or roasted without salt
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
## 2019-06-25 RX ORDER — AMLODIPINE BESYLATE 10 MG/1
TABLET ORAL
Qty: 90 TABLET | Refills: 1 | Status: SHIPPED | OUTPATIENT
Start: 2019-06-25 | End: 2019-12-13

## 2019-06-25 NOTE — TELEPHONE ENCOUNTER
Refill passed per [facility] protocol.   Hypertensive Medications  Protocol Criteria:  · Appointment scheduled in the past 6 months or in the next 3 months  · BMP or CMP in the past 12 months  · Creatinine result < 2  Recent Outpatient Visits

## 2019-07-20 ENCOUNTER — OFFICE VISIT (OUTPATIENT)
Dept: DERMATOLOGY CLINIC | Facility: CLINIC | Age: 69
End: 2019-07-20
Payer: COMMERCIAL

## 2019-07-20 DIAGNOSIS — L57.0 AK (ACTINIC KERATOSIS): Primary | ICD-10-CM

## 2019-07-20 DIAGNOSIS — D23.5 BENIGN NEOPLASM OF SKIN OF TRUNK, EXCEPT SCROTUM: ICD-10-CM

## 2019-07-20 DIAGNOSIS — D23.60 BENIGN NEOPLASM OF SKIN OF UPPER LIMB, INCLUDING SHOULDER, UNSPECIFIED LATERALITY: ICD-10-CM

## 2019-07-20 DIAGNOSIS — D23.4 BENIGN NEOPLASM OF SCALP AND SKIN OF NECK: ICD-10-CM

## 2019-07-20 DIAGNOSIS — D23.30 BENIGN NEOPLASM OF SKIN OF FACE: ICD-10-CM

## 2019-07-20 DIAGNOSIS — L71.9 ROSACEA: ICD-10-CM

## 2019-07-20 PROCEDURE — 17000 DESTRUCT PREMALG LESION: CPT | Performed by: DERMATOLOGY

## 2019-07-20 PROCEDURE — 99213 OFFICE O/P EST LOW 20 MIN: CPT | Performed by: DERMATOLOGY

## 2019-07-29 NOTE — PROGRESS NOTES
Melyssa Rowan is a 81year old male.   HPI:     CC:  Patient presents with:  Full Skin Exam: LOV 03/26/18 pt seen for several lesions pt here for f/u full body check has no new spots of concern at this time has personal Hx of  AK's and family Hx of [place] Multiple Vitamins-Minerals (MULTI-VITAMIN/MINERALS) Oral Tab Take 1 tablet by mouth daily.  Disp:  Rfl:    ONETOUCH ULTRA BLUE In Vitro Strip TEST SUGAR EVERY DAY Disp: 100 strip Rfl: 3   AMLODIPINE BESYLATE 10 MG Oral Tab TAKE 1 TABLET BY MOUTH EVERY DAY on file        Emotionally abused: Not on file        Physically abused: Not on file        Forced sexual activity: Not on file    Other Topics      Concerns:         Service: Not Asked        Blood Transfusions: Not Asked        Caffeine Concern: lesions other than noted above. No fevers, chills, night sweats, unusual sun sensitivity. No other skin complaints. History, medications, allergies reviewed as noted.        Physical Examination:     Well-developed well-nourished patient alert orien of various therapies, risks benefits discussed. Pathophysiology discussed with patient. Therapeutic options reviewed. See  Medications in grid. Instructions reviewed at length.     Benign nevi, seborrheic  keratoses, cherry angiomas:  Reassurance regardin

## 2019-08-15 ENCOUNTER — TELEPHONE (OUTPATIENT)
Dept: OTHER | Age: 69
End: 2019-08-15

## 2019-08-15 NOTE — TELEPHONE ENCOUNTER
Patient is requesting the Antibiotic Amoxicillin  Through the patient clinical update encounter. I called the patent who stated he will call us back for he is not available right now.

## 2019-08-16 NOTE — TELEPHONE ENCOUNTER
Reached patient by calling Jerome Langley, his spouse (on ROSA M) as his vm has not been set up yet. He answered her phone and has no idea why it's saying he's requesting any antibiotics.  Will close encounter

## 2019-09-06 DIAGNOSIS — E78.2 MIXED HYPERLIPIDEMIA: ICD-10-CM

## 2019-09-06 RX ORDER — ROSUVASTATIN CALCIUM 5 MG/1
TABLET, COATED ORAL
Qty: 90 TABLET | Refills: 1 | Status: SHIPPED | OUTPATIENT
Start: 2019-09-06 | End: 2020-03-10

## 2019-09-06 NOTE — TELEPHONE ENCOUNTER
Cholesterol Medications  Protocol Criteria:  · Appointment scheduled in the past 12 months or in the next 3 months  · ALT & LDL on file in the past 12 months  · ALT result < 80  · LDL result <130   Recent Outpatient Visits            1 month ago PEDRO martin

## 2019-12-09 DIAGNOSIS — E11.9 DIABETES MELLITUS TYPE 2 WITHOUT RETINOPATHY (HCC): ICD-10-CM

## 2019-12-13 RX ORDER — AMLODIPINE BESYLATE 10 MG/1
TABLET ORAL
Qty: 90 TABLET | Refills: 0 | Status: SHIPPED | OUTPATIENT
Start: 2019-12-13 | End: 2020-02-10

## 2020-01-22 ENCOUNTER — TELEPHONE (OUTPATIENT)
Dept: CARDIOLOGY CLINIC | Facility: CLINIC | Age: 70
End: 2020-01-22

## 2020-01-22 RX ORDER — HYDROCHLOROTHIAZIDE 25 MG/1
25 TABLET ORAL DAILY
Qty: 90 TABLET | Refills: 1 | Status: SHIPPED | OUTPATIENT
Start: 2020-01-22 | End: 2020-02-10

## 2020-01-22 RX ORDER — VALSARTAN 320 MG/1
320 TABLET ORAL DAILY
Qty: 90 TABLET | Refills: 1 | Status: SHIPPED | OUTPATIENT
Start: 2020-01-22 | End: 2020-01-27

## 2020-01-22 NOTE — TELEPHONE ENCOUNTER
Drug Change Request - medication on backorder with no release day. Please split valsartan and hydrochlorothiazide and fax new script. Thank you!       Current Outpatient Medications:     •  Valsartan-hydroCHLOROthiazide 320-25 MG Oral Tab, Take 1 tablet b

## 2020-01-27 ENCOUNTER — TELEPHONE (OUTPATIENT)
Dept: CARDIOLOGY CLINIC | Facility: CLINIC | Age: 70
End: 2020-01-27

## 2020-01-27 RX ORDER — LOSARTAN POTASSIUM 50 MG/1
50 TABLET ORAL DAILY
Qty: 90 TABLET | Refills: 0 | Status: SHIPPED | OUTPATIENT
Start: 2020-01-27 | End: 2020-02-10

## 2020-01-27 NOTE — TELEPHONE ENCOUNTER
Pharm says this is not available. Please advise. Thank you! Current Outpatient Medications:   •  valsartan 320 MG Oral Tab, Take 1 tablet (320 mg total) by mouth daily. , Disp: 90 tablet, Rfl: 1

## 2020-01-28 NOTE — TELEPHONE ENCOUNTER
Dr Gilmer Troncoso please see message on TE 1/22: Combination Valsartan hctz 320-25 mg tab on back order.

## 2020-01-28 NOTE — TELEPHONE ENCOUNTER
Spoke with [facility] pharmacist and she states the combo Valsartan Hctz was transferred to [facility] [address] on 1/17. Their record is showing this . Spoke with [facility] pharmacist also and was told no combo available and Valsartan is on back order.  Pt got

## 2020-02-03 NOTE — TELEPHONE ENCOUNTER
Please see message from pharmacy and advise if okay to dispense 160/12.5 (take 2 tablets). Thank you.

## 2020-02-03 NOTE — TELEPHONE ENCOUNTER
Gabriela calling and states from [facility] pharmacy medication on back order     Valsartan hctz 320-25 mg tab on back order.  But she have the 160 mg is available can patient take two of the 160 mg     please advise [phone]

## 2020-02-05 DIAGNOSIS — E78.2 MIXED HYPERLIPIDEMIA: ICD-10-CM

## 2020-02-05 DIAGNOSIS — I10 ESSENTIAL HYPERTENSION: ICD-10-CM

## 2020-02-05 NOTE — TELEPHONE ENCOUNTER
Pt.is requesting refills for these medications.   Pt. Originally filled Rx at [facility] and is not getting them at [facility]      Current Outpatient Medications:     •  ROSUVASTATIN CALCIUM 5 MG Oral Tab, TAKE 1 TABLET BY MOUTH EVERY DAY AT NIGHT, Disp: 90 tablet

## 2020-02-06 DIAGNOSIS — R74.8 ELEVATED LIVER ENZYMES: Primary | ICD-10-CM

## 2020-02-06 RX ORDER — ROSUVASTATIN CALCIUM 5 MG/1
TABLET, COATED ORAL
Qty: 90 TABLET | Refills: 1 | OUTPATIENT
Start: 2020-02-06

## 2020-02-06 RX ORDER — NIFEDIPINE 60 MG/1
60 TABLET, FILM COATED, EXTENDED RELEASE ORAL
Qty: 90 TABLET | Refills: 1 | Status: SHIPPED | OUTPATIENT
Start: 2020-02-06 | End: 2020-02-10

## 2020-02-06 NOTE — TELEPHONE ENCOUNTER
Cholesterol Medications  Please review; protocol failed. Pt is overdue for Lipid panel.   Pt has OV scheduled 2/10/20 with Dr Flores Farrell    Protocol Criteria:  · Appointment scheduled in the past 12 months or in the next 3 months  · ALT & LDL on file in the p

## 2020-02-06 NOTE — TELEPHONE ENCOUNTER
Hypertensive Medications Refill passed per [facility] protocol.     Protocol Criteria:  · Appointment scheduled in the past 6 months or in the next 3 months  · BMP or CMP in the past 12 months  · Creatinine result < 2  Recent Outpatient Visits

## 2020-02-06 NOTE — TELEPHONE ENCOUNTER
This was stopped due to elevated liver enzymes, I have ordered a repeat blood test for him–if it is okay then he can restart his medications--he will need blood work first

## 2020-02-10 ENCOUNTER — OFFICE VISIT (OUTPATIENT)
Dept: INTERNAL MEDICINE CLINIC | Facility: CLINIC | Age: 70
End: 2020-02-10
Payer: COMMERCIAL

## 2020-02-10 VITALS
SYSTOLIC BLOOD PRESSURE: 163 MMHG | WEIGHT: 193 LBS | BODY MASS INDEX: 26.43 KG/M2 | HEIGHT: 71.5 IN | DIASTOLIC BLOOD PRESSURE: 78 MMHG | HEART RATE: 62 BPM

## 2020-02-10 DIAGNOSIS — Z12.5 ENCOUNTER FOR SCREENING FOR MALIGNANT NEOPLASM OF PROSTATE: ICD-10-CM

## 2020-02-10 DIAGNOSIS — E87.1 HYPONATREMIA: ICD-10-CM

## 2020-02-10 DIAGNOSIS — E11.9 DIABETES MELLITUS TYPE 2 WITHOUT RETINOPATHY (HCC): ICD-10-CM

## 2020-02-10 DIAGNOSIS — Z00.00 PHYSICAL EXAM: Primary | ICD-10-CM

## 2020-02-10 DIAGNOSIS — Z23 NEED FOR VACCINATION: ICD-10-CM

## 2020-02-10 DIAGNOSIS — F31.9 BIPOLAR 1 DISORDER (HCC): ICD-10-CM

## 2020-02-10 DIAGNOSIS — R74.8 ELEVATED LIVER ENZYMES: ICD-10-CM

## 2020-02-10 DIAGNOSIS — Z12.11 COLON CANCER SCREENING: ICD-10-CM

## 2020-02-10 DIAGNOSIS — I10 ESSENTIAL HYPERTENSION: ICD-10-CM

## 2020-02-10 PROCEDURE — 99397 PER PM REEVAL EST PAT 65+ YR: CPT | Performed by: INTERNAL MEDICINE

## 2020-02-10 PROCEDURE — 99213 OFFICE O/P EST LOW 20 MIN: CPT | Performed by: INTERNAL MEDICINE

## 2020-02-10 PROCEDURE — 90471 IMMUNIZATION ADMIN: CPT | Performed by: INTERNAL MEDICINE

## 2020-02-10 PROCEDURE — 90732 PPSV23 VACC 2 YRS+ SUBQ/IM: CPT | Performed by: INTERNAL MEDICINE

## 2020-02-10 RX ORDER — VALSARTAN 320 MG/1
320 TABLET ORAL DAILY
COMMUNITY
End: 2020-02-10

## 2020-02-10 RX ORDER — VALSARTAN 320 MG/1
320 TABLET ORAL DAILY
Qty: 90 TABLET | Refills: 3 | Status: SHIPPED | OUTPATIENT
Start: 2020-02-10 | End: 2020-10-29

## 2020-02-10 RX ORDER — HYDROCHLOROTHIAZIDE 25 MG/1
25 TABLET ORAL DAILY
Qty: 90 TABLET | Refills: 3 | Status: SHIPPED | OUTPATIENT
Start: 2020-02-10 | End: 2020-07-20

## 2020-02-10 RX ORDER — NIFEDIPINE 60 MG/1
60 TABLET, FILM COATED, EXTENDED RELEASE ORAL
Qty: 90 TABLET | Refills: 3 | Status: SHIPPED | OUTPATIENT
Start: 2020-02-10 | End: 2020-10-29

## 2020-02-10 NOTE — PROGRESS NOTES
Gill Begum is a 81year old male.   Patient presents with:  Physical      HPI:   Pt comes for physical   C/c physical  C/o over the summer had gynacomastia and so he wanted to and try to grow breasts so tried herbal -peuraria mirifica capsules and f tablet 3   • valsartan 320 MG Oral Tab Take 1 tablet (320 mg total) by mouth daily.  90 tablet 3   • ROSUVASTATIN CALCIUM 5 MG Oral Tab TAKE 1 TABLET BY MOUTH EVERY DAY AT NIGHT 90 tablet 1   • clotrimazole-betamethasone 1-0.05 % External Cream Apply 1 Appl developed, well nourished,in no apparent distress  SKIN: no rashes,no suspicious lesions  HEENT: atraumatic, normocephalic,ears and throat are clear, no frontal or maxillary sinus tenderness, pupils equal reactive to light bilaterally, extraocular muscles ([facility]  -     metFORMIN HCl 1000 MG Oral Tab;  Take 1 tablet (1,000 mg total) by mouth 2 (two) times daily.  -     OPHTHALMOLOGY - INTERNAL  -     HEMOGLOBIN A1C; Future  -     ASSAY, THYROID STIM HORMONE; Future     Hba1c 6.6 on 2/19 and 6.4 on 6/198  -will

## 2020-02-15 ENCOUNTER — LAB ENCOUNTER (OUTPATIENT)
Dept: LAB | Age: 70
End: 2020-02-15
Attending: INTERNAL MEDICINE
Payer: COMMERCIAL

## 2020-02-15 DIAGNOSIS — Z00.00 PHYSICAL EXAM: ICD-10-CM

## 2020-02-15 DIAGNOSIS — E11.9 DIABETES MELLITUS TYPE 2 WITHOUT RETINOPATHY (HCC): ICD-10-CM

## 2020-02-15 DIAGNOSIS — R74.8 ELEVATED LIVER ENZYMES: ICD-10-CM

## 2020-02-15 DIAGNOSIS — Z12.5 ENCOUNTER FOR SCREENING FOR MALIGNANT NEOPLASM OF PROSTATE: ICD-10-CM

## 2020-02-15 DIAGNOSIS — E87.1 HYPONATREMIA: ICD-10-CM

## 2020-02-15 DIAGNOSIS — F31.9 BIPOLAR 1 DISORDER (HCC): ICD-10-CM

## 2020-02-15 LAB
ALBUMIN SERPL-MCNC: 3.7 G/DL (ref 3.4–5)
ALBUMIN/GLOB SERPL: 1.1 {RATIO} (ref 1–2)
ALP LIVER SERPL-CCNC: 111 U/L (ref 45–117)
ALT SERPL-CCNC: 20 U/L (ref 16–61)
ANION GAP SERPL CALC-SCNC: 5 MMOL/L (ref 0–18)
AST SERPL-CCNC: 11 U/L (ref 15–37)
BASOPHILS # BLD AUTO: 0.04 X10(3) UL (ref 0–0.2)
BASOPHILS NFR BLD AUTO: 0.6 %
BILIRUB SERPL-MCNC: 0.3 MG/DL (ref 0.1–2)
BUN BLD-MCNC: 19 MG/DL (ref 7–18)
BUN/CREAT SERPL: 18.6 (ref 10–20)
CALCIUM BLD-MCNC: 9 MG/DL (ref 8.5–10.1)
CHLORIDE SERPL-SCNC: 100 MMOL/L (ref 98–112)
CHOLEST SMN-MCNC: 156 MG/DL (ref ?–200)
CO2 SERPL-SCNC: 29 MMOL/L (ref 21–32)
COMPLEXED PSA SERPL-MCNC: 4.13 NG/ML (ref ?–4)
CREAT BLD-MCNC: 1.02 MG/DL (ref 0.7–1.3)
DEPRECATED RDW RBC AUTO: 37.2 FL (ref 35.1–46.3)
EOSINOPHIL # BLD AUTO: 0.26 X10(3) UL (ref 0–0.7)
EOSINOPHIL NFR BLD AUTO: 3.9 %
ERYTHROCYTE [DISTWIDTH] IN BLOOD BY AUTOMATED COUNT: 11.6 % (ref 11–15)
EST. AVERAGE GLUCOSE BLD GHB EST-MCNC: 143 MG/DL (ref 68–126)
GLOBULIN PLAS-MCNC: 3.3 G/DL (ref 2.8–4.4)
GLUCOSE BLD-MCNC: 130 MG/DL (ref 70–99)
HBA1C MFR BLD HPLC: 6.6 % (ref ?–5.7)
HCT VFR BLD AUTO: 41.5 % (ref 39–53)
HDLC SERPL-MCNC: 40 MG/DL (ref 40–59)
HGB BLD-MCNC: 14.3 G/DL (ref 13–17.5)
IMM GRANULOCYTES # BLD AUTO: 0.01 X10(3) UL (ref 0–1)
IMM GRANULOCYTES NFR BLD: 0.1 %
LDLC SERPL CALC-MCNC: 100 MG/DL (ref ?–100)
LYMPHOCYTES # BLD AUTO: 1.36 X10(3) UL (ref 1–4)
LYMPHOCYTES NFR BLD AUTO: 20.3 %
M PROTEIN MFR SERPL ELPH: 7 G/DL (ref 6.4–8.2)
MCH RBC QN AUTO: 30.2 PG (ref 26–34)
MCHC RBC AUTO-ENTMCNC: 34.5 G/DL (ref 31–37)
MCV RBC AUTO: 87.6 FL (ref 80–100)
MONOCYTES # BLD AUTO: 0.61 X10(3) UL (ref 0.1–1)
MONOCYTES NFR BLD AUTO: 9.1 %
NEUTROPHILS # BLD AUTO: 4.41 X10 (3) UL (ref 1.5–7.7)
NEUTROPHILS # BLD AUTO: 4.41 X10(3) UL (ref 1.5–7.7)
NEUTROPHILS NFR BLD AUTO: 66 %
NONHDLC SERPL-MCNC: 116 MG/DL (ref ?–130)
OSMOLALITY SERPL CALC.SUM OF ELEC: 282 MOSM/KG (ref 275–295)
PATIENT FASTING Y/N/NP: YES
PATIENT FASTING Y/N/NP: YES
PLATELET # BLD AUTO: 261 10(3)UL (ref 150–450)
POTASSIUM SERPL-SCNC: 4.2 MMOL/L (ref 3.5–5.1)
RBC # BLD AUTO: 4.74 X10(6)UL (ref 3.8–5.8)
SODIUM SERPL-SCNC: 134 MMOL/L (ref 136–145)
TRIGL SERPL-MCNC: 81 MG/DL (ref 30–149)
TSI SER-ACNC: 1.87 MIU/ML (ref 0.36–3.74)
VLDLC SERPL CALC-MCNC: 16 MG/DL (ref 0–30)
WBC # BLD AUTO: 6.7 X10(3) UL (ref 4–11)

## 2020-02-15 PROCEDURE — 83036 HEMOGLOBIN GLYCOSYLATED A1C: CPT

## 2020-02-15 PROCEDURE — 36415 COLL VENOUS BLD VENIPUNCTURE: CPT

## 2020-02-15 PROCEDURE — 85025 COMPLETE CBC W/AUTO DIFF WBC: CPT

## 2020-02-15 PROCEDURE — 80061 LIPID PANEL: CPT

## 2020-02-15 PROCEDURE — 84443 ASSAY THYROID STIM HORMONE: CPT

## 2020-02-15 PROCEDURE — 80053 COMPREHEN METABOLIC PANEL: CPT

## 2020-02-17 DIAGNOSIS — R97.20 ELEVATED PSA: Primary | ICD-10-CM

## 2020-03-02 DIAGNOSIS — I10 ESSENTIAL HYPERTENSION: ICD-10-CM

## 2020-03-03 RX ORDER — NIFEDIPINE 60 MG/1
TABLET, EXTENDED RELEASE ORAL
Qty: 90 TABLET | Refills: 1 | OUTPATIENT
Start: 2020-03-03

## 2020-03-09 ENCOUNTER — TELEPHONE (OUTPATIENT)
Dept: INTERNAL MEDICINE CLINIC | Facility: CLINIC | Age: 70
End: 2020-03-09

## 2020-03-09 DIAGNOSIS — E78.2 MIXED HYPERLIPIDEMIA: ICD-10-CM

## 2020-03-09 RX ORDER — AMLODIPINE BESYLATE 10 MG/1
TABLET ORAL
Qty: 90 TABLET | Refills: 0 | OUTPATIENT
Start: 2020-03-09

## 2020-03-09 NOTE — TELEPHONE ENCOUNTER
From note on 2/10/20 it looks like patient no longer takes this medication    Essential hypertension  -     NIFEdipine ER 60 MG Oral Tablet 24 Hr; Take 1 tablet (60 mg total) by mouth once daily. -     hydrochlorothiazide 25 MG Oral Tab;  Take 1 tablet (25

## 2020-03-09 NOTE — TELEPHONE ENCOUNTER
On back order - Please advise. Current Outpatient Medications:     •  valsartan 320 MG Oral Tab, Take 1 tablet (320 mg total) by mouth daily. , Disp: 90 tablet, Rfl: 3

## 2020-03-10 RX ORDER — ROSUVASTATIN CALCIUM 5 MG/1
TABLET, COATED ORAL
Qty: 90 TABLET | Refills: 1 | Status: SHIPPED | OUTPATIENT
Start: 2020-03-10 | End: 2020-07-20

## 2020-03-11 NOTE — TELEPHONE ENCOUNTER
Unable to leave a message. Please transfer to triage. 1st attempt. I have also sent patient a [employer] message to call us back.

## 2020-03-11 NOTE — TELEPHONE ENCOUNTER
Refill passed per [facility] protocol.   Cholesterol Medications  Protocol Criteria:  · Appointment scheduled in the past 12 months or in the next 3 months  · ALT & LDL on file in the past 12 months  · ALT result < 80  · LDL result <130   Recent Outpat

## 2020-03-13 NOTE — TELEPHONE ENCOUNTER
Unable to leave message as patient's mailbox is not set up. Patient has also not read [employer] message sent to him on 03/11/20.

## 2020-03-14 NOTE — TELEPHONE ENCOUNTER
Advised patient of Dr. Leticia Mckeon note. Patient verbalized understanding. States he does not recall ever taking Losartan.  Appears it was ordered in January in response to pharmacy request but then discontinued as pharmacy was able to provide patient with [facility]

## 2020-03-16 ENCOUNTER — TELEPHONE (OUTPATIENT)
Dept: INTERNAL MEDICINE CLINIC | Facility: CLINIC | Age: 70
End: 2020-03-16

## 2020-03-18 NOTE — TELEPHONE ENCOUNTER
Called patient when patient was received. Patient was asymptomatic with a blood pressure of 797 systolic. Denied any chest pain, shortness of breath, headaches, visual changes, numbness, tingling. He had taken his medications that day.     Told to [facility]

## 2020-03-18 NOTE — TELEPHONE ENCOUNTER
Paging    Message # [phone] 2020 06:25p [COLLEENS]  To:  From: GREG Sneed MD:  Phone#:  ----------------------------------------------------------------------  Khoa RODRIGUEZ [phone]  1950 RE PT'S BLOOD PRESSURE  IS HIGH, PT OF

## 2020-05-01 NOTE — TELEPHONE ENCOUNTER
Patient stated that the valsartan is still on back order at [facility]. Put patient on hold and tried calling [facility] but they do not open till 9am, so LMTCB with patient information.   In the meantime patient will check if other pharmacy have medication i

## 2020-05-01 NOTE — TELEPHONE ENCOUNTER
[facility] called back indicated that only have 30 pills of the valsartan/hydrochlorothiazide 320/25mg otherwise on back order. Valsartan 160/12.5mg only 30 in stock and on back order as well. Valsartan 320mg and 160mg on back order too.

## 2020-05-14 NOTE — TELEPHONE ENCOUNTER
Spoke to [address] and stated that all the valsartan's are still on back order. Patient stated that still has a lot of medication left, but did contact other pharmacies that did have his medication.  Patient stated that will transfer script abo

## 2020-07-20 DIAGNOSIS — I10 ESSENTIAL HYPERTENSION: ICD-10-CM

## 2020-07-20 DIAGNOSIS — E78.2 MIXED HYPERLIPIDEMIA: ICD-10-CM

## 2020-07-20 RX ORDER — ROSUVASTATIN CALCIUM 5 MG/1
5 TABLET, COATED ORAL NIGHTLY
Qty: 90 TABLET | Refills: 1 | Status: SHIPPED | OUTPATIENT
Start: 2020-07-20 | End: 2020-09-05

## 2020-07-20 RX ORDER — HYDROCHLOROTHIAZIDE 25 MG/1
TABLET ORAL
Qty: 90 TABLET | Refills: 3 | Status: SHIPPED | OUTPATIENT
Start: 2020-07-20 | End: 2020-10-29

## 2020-08-11 RX ORDER — VALSARTAN AND HYDROCHLOROTHIAZIDE 320; 25 MG/1; MG/1
TABLET, FILM COATED ORAL
Qty: 90 TABLET | Refills: 0 | OUTPATIENT
Start: 2020-08-11

## 2020-08-11 NOTE — TELEPHONE ENCOUNTER
Name from pharmacy: Valsartan/Hydrochlorothiazide 320-25 Mg Tab Natalie  Please see note below. Unable to leave a message voice mailbox is not set up as yet. please transfer to [employer]. A [employer] message was also sent.

## 2020-08-11 NOTE — TELEPHONE ENCOUNTER
Please ask patient if he is taking the combination pill or if he needs his valsartan and hydrochlorothiazide separately

## 2020-08-13 NOTE — TELEPHONE ENCOUNTER
Attempted to call and voice mail not set up as yet. Has not read his [employer] message. No response letter sent in the mail.

## 2020-09-05 DIAGNOSIS — E78.2 MIXED HYPERLIPIDEMIA: ICD-10-CM

## 2020-09-05 RX ORDER — ROSUVASTATIN CALCIUM 5 MG/1
TABLET, COATED ORAL
Qty: 90 TABLET | Refills: 1 | Status: SHIPPED | OUTPATIENT
Start: 2020-09-05 | End: 2021-03-04

## 2020-09-09 RX ORDER — POLYETHYLENE GLYCOL 3350, SODIUM CHLORIDE, SODIUM BICARBONATE, POTASSIUM CHLORIDE 420; 11.2; 5.72; 1.48 G/4L; G/4L; G/4L; G/4L
POWDER, FOR SOLUTION ORAL
Qty: 1 BOTTLE | Refills: 0 | Status: CANCELLED | OUTPATIENT
Start: 2020-09-09

## 2020-09-09 NOTE — PROGRESS NOTES
[facility] Follow-up Visit    Erin Occupation: Psychologist  - Lives with spouse  - NSAIDs/ASA use: ASA 81 mg daily    History, Medications, Allergies, ROS:      Past Medical History:   Diagnosis Date   • [facility]    • Constipation intermittent   • Diabetes ([facility]    • Hemorr External Cream Apply 1 Application topically 2 (two) times daily as needed. 60 g 2   • Cholecalciferol 2000 UNITS Oral Cap Take 1 capsule by mouth daily. • Multiple Vitamins-Minerals (MULTI-VITAMIN/MINERALS) Oral Tab Take 1 tablet by mouth daily.      • discussed with patient today. See HPI and A&P for further details.      .  ASSESSMENT/PLAN:   Jeromy Ospina is a 78year old year-old male, patient of Dr. Zoie Reddy with history of adenomatous colon polyps, GERD, internal hemorrhoids, chronic constipati to contact his/her insurance company regarding questions about out-of-pocket cost/benefits and was provided the appropriate diagnostic information/codes. All questions were answered to the patient’s satisfaction.  The patient signed informed consent and ruperto

## 2020-09-16 ENCOUNTER — TELEPHONE (OUTPATIENT)
Dept: GASTROENTEROLOGY | Facility: CLINIC | Age: 70
End: 2020-09-16

## 2020-09-16 ENCOUNTER — OFFICE VISIT (OUTPATIENT)
Dept: GASTROENTEROLOGY | Facility: CLINIC | Age: 70
End: 2020-09-16
Payer: COMMERCIAL

## 2020-09-16 VITALS
HEIGHT: 71.5 IN | HEART RATE: 63 BPM | WEIGHT: 195 LBS | BODY MASS INDEX: 26.7 KG/M2 | SYSTOLIC BLOOD PRESSURE: 133 MMHG | DIASTOLIC BLOOD PRESSURE: 75 MMHG | TEMPERATURE: 98 F

## 2020-09-16 DIAGNOSIS — Z86.010 HISTORY OF COLON POLYPS: Primary | ICD-10-CM

## 2020-09-16 PROCEDURE — 3078F DIAST BP <80 MM HG: CPT | Performed by: NURSE PRACTITIONER

## 2020-09-16 PROCEDURE — 3008F BODY MASS INDEX DOCD: CPT | Performed by: NURSE PRACTITIONER

## 2020-09-16 PROCEDURE — 99214 OFFICE O/P EST MOD 30 MIN: CPT | Performed by: NURSE PRACTITIONER

## 2020-09-16 PROCEDURE — 3075F SYST BP GE 130 - 139MM HG: CPT | Performed by: NURSE PRACTITIONER

## 2020-09-16 NOTE — TELEPHONE ENCOUNTER
Scheduled for:  Colonoscopy 30546  Provider Name:  Dr Kumar Alert  Date:  10/02/2020  Location:  [facility]  Sedation:  MAC  Time:  1:45PM (pt is aware that [facility] will call the day before to confirm arrival time)    Prep:  Trilyte  Meds/Allergies Reconciled?:  Valeria Renal :    Patient seen and examined    Still w. HA , Low grade Fever, Abdominal pain Less,  no c/o CP SOB N,V   No swelling feet    Patient without any significant change    Vital Signs Last 24 Hrs  T(C): 38 (09-20-17 @ 07:37), Max: 38.8 (09-19-17 @ 18:58)  T(F): 100.4 (09-20-17 @ 07:37), Max: 101.8 (09-19-17 @ 18:58)  HR: 71 (09-20-17 @ 07:37) (66 - 75)  BP: 114/68 (09-20-17 @ 07:37) (106/65 - 131/83)  BP(mean): --  RR: 18 (09-20-17 @ 07:37) (17 - 20)  SpO2: 94% (09-20-17 @ 07:37) (94% - 97%)    Chest   clear  CV   no murmur  Abd   ; Markedly enlarged Kidneys , Tender R - Kidney   Extr   No edema  Neuro  grossly intact no motor deficits,    Patient overall stable  Labs and Meds reviewed    Continue same treatment    20 Sep 2017 05:53    135    |  96     |  31.0   ----------------------------<  86     4.6     |  27.0   |  3.01     Ca    9.1        20 Sep 2017 05:53                            10.3   10.8  )-----------( 228      ( 20 Sep 2017 05:54 )             32.4           A; PKD , C. Difficile colitis, CKD -4

## 2020-09-16 NOTE — PATIENT INSTRUCTIONS
-Schedule colonoscopy w/ Dr. Molly Rivera with IV Twilight or MAC  Dx: hx colon polyps   -Eligible for NE: No r/t history diabetes/hypertension  -Prep: Split dose Suprep or Colyte/TriLyte or equivalent  -Anti-platelets and anti-coagulants: ASA-continue a

## 2020-09-23 ENCOUNTER — PATIENT MESSAGE (OUTPATIENT)
Dept: GASTROENTEROLOGY | Facility: CLINIC | Age: 70
End: 2020-09-23

## 2020-09-23 NOTE — TELEPHONE ENCOUNTER
From: Adriana Rodriguez II  To: STEPHANIE Johnson  Sent: 9/23/2020 11:58 AM CDT  Subject: Visit Follow-up Question    When there, we scheduled an appointment for the Colonoscopy for 10/2/2020 at 1:45 [facility]. It is not listed in my chart.  The ride and time of

## 2020-09-25 ENCOUNTER — PATIENT MESSAGE (OUTPATIENT)
Dept: GASTROENTEROLOGY | Facility: CLINIC | Age: 70
End: 2020-09-25

## 2020-09-25 NOTE — TELEPHONE ENCOUNTER
From: Susanna Yu II  To: STEPHANIE Silva  Sent: 9/25/2020 12:28 PM CDT  Subject: Visit Follow-up Question    A COVID test was suggested prior to the procedure. If this is necessary, where should I go?

## 2020-09-26 ENCOUNTER — PATIENT MESSAGE (OUTPATIENT)
Dept: GASTROENTEROLOGY | Facility: CLINIC | Age: 70
End: 2020-09-26

## 2020-09-28 NOTE — TELEPHONE ENCOUNTER
From: Denver Lawrence II  To: STEPHANIE Dinero  Sent: 9/26/2020 3:13 PM CDT  Subject: Non-Urgent Medical Question    Tried to do questionnaires for colonoscopy from [employer]. However, message says no questionnaires available. Can you resend?

## 2020-09-29 ENCOUNTER — LAB REQUISITION (OUTPATIENT)
Dept: LAB | Facility: HOSPITAL | Age: 70
End: 2020-09-29
Payer: COMMERCIAL

## 2020-09-29 DIAGNOSIS — Z01.818 ENCOUNTER FOR OTHER PREPROCEDURAL EXAMINATION: ICD-10-CM

## 2020-10-02 ENCOUNTER — SURGERY CENTER DOCUMENTATION (OUTPATIENT)
Dept: SURGERY | Age: 70
End: 2020-10-02

## 2020-10-02 ENCOUNTER — LAB REQUISITION (OUTPATIENT)
Dept: LAB | Facility: HOSPITAL | Age: 70
End: 2020-10-02
Payer: COMMERCIAL

## 2020-10-02 DIAGNOSIS — Z12.11 ENCOUNTER FOR SCREENING FOR MALIGNANT NEOPLASM OF COLON: ICD-10-CM

## 2020-10-02 PROCEDURE — 45385 COLONOSCOPY W/LESION REMOVAL: CPT | Performed by: INTERNAL MEDICINE

## 2020-10-02 PROCEDURE — 88305 TISSUE EXAM BY PATHOLOGIST: CPT | Performed by: INTERNAL MEDICINE

## 2020-10-02 NOTE — PROCEDURES
[address] Endoscopy Report      Date of Procedure:  10/02/20      Preoperative Diagnosis:  1. Colorectal cancer screening  2.   Personal history of adenomatous colon polyps      Postoperative Diagnosis:  Multiple colon polyps (#16 7-8 mm sessile polyp which was cold snare excised and retrieved. 2.  In the ascending colon there were #3 polyps measuring 5-7 mm in size. These were cold snare excised and retrieved.   3.  In the transverse colon there were #7 polyps measuring 5-9 mm in

## 2020-10-06 ENCOUNTER — TELEPHONE (OUTPATIENT)
Dept: GASTROENTEROLOGY | Facility: CLINIC | Age: 70
End: 2020-10-06

## 2020-10-06 NOTE — TELEPHONE ENCOUNTER
Entered into Epic:Recall colon in 1 year per Dr. Blanca Chapman. Last Colon done 10/2/2020, next due 10/2/2021. HM updated.

## 2020-10-06 NOTE — TELEPHONE ENCOUNTER
----- Message from Bryan Vergara MD sent at 10/6/2020  5:21 PM CDT -----  I spoke to the patient. He is feeling well. He had #16 polyps removed. Most were adenomatous. I have discussed the significance.   I have recommended a surveillance colonos

## 2020-10-29 DIAGNOSIS — I10 ESSENTIAL HYPERTENSION: ICD-10-CM

## 2020-10-30 RX ORDER — NIFEDIPINE 60 MG/1
60 TABLET, FILM COATED, EXTENDED RELEASE ORAL
Qty: 90 TABLET | Refills: 3 | Status: SHIPPED | OUTPATIENT
Start: 2020-10-30 | End: 2021-09-29

## 2020-10-30 RX ORDER — VALSARTAN 320 MG/1
320 TABLET ORAL DAILY
Qty: 90 TABLET | Refills: 3 | Status: SHIPPED | OUTPATIENT
Start: 2020-10-30 | End: 2021-10-19

## 2020-10-30 RX ORDER — HYDROCHLOROTHIAZIDE 25 MG/1
25 TABLET ORAL DAILY
Qty: 90 TABLET | Refills: 3 | Status: SHIPPED | OUTPATIENT
Start: 2020-10-30

## 2021-03-04 DIAGNOSIS — Z23 NEED FOR VACCINATION: ICD-10-CM

## 2021-03-04 DIAGNOSIS — E78.2 MIXED HYPERLIPIDEMIA: ICD-10-CM

## 2021-03-04 RX ORDER — ROSUVASTATIN CALCIUM 5 MG/1
TABLET, COATED ORAL
Qty: 90 TABLET | Refills: 1 | Status: SHIPPED | OUTPATIENT
Start: 2021-03-04 | End: 2021-10-25

## 2021-03-10 ENCOUNTER — TELEPHONE (OUTPATIENT)
Dept: INTERNAL MEDICINE CLINIC | Facility: CLINIC | Age: 71
End: 2021-03-10

## 2021-03-10 DIAGNOSIS — E11.9 DIABETES MELLITUS TYPE 2 WITHOUT RETINOPATHY (HCC): Primary | ICD-10-CM

## 2021-03-10 DIAGNOSIS — Z12.5 ENCOUNTER FOR SCREENING FOR MALIGNANT NEOPLASM OF PROSTATE: ICD-10-CM

## 2021-03-10 NOTE — TELEPHONE ENCOUNTER
Pt contacted and wants labs ordered before making a f/u appt. Dr. Danielle Valdivia what labs do you want on this pt.

## 2021-03-20 ENCOUNTER — LAB ENCOUNTER (OUTPATIENT)
Dept: LAB | Facility: HOSPITAL | Age: 71
End: 2021-03-20
Attending: INTERNAL MEDICINE
Payer: COMMERCIAL

## 2021-03-20 DIAGNOSIS — Z12.5 ENCOUNTER FOR SCREENING FOR MALIGNANT NEOPLASM OF PROSTATE: ICD-10-CM

## 2021-03-20 DIAGNOSIS — E11.9 DIABETES MELLITUS TYPE 2 WITHOUT RETINOPATHY (HCC): ICD-10-CM

## 2021-03-20 LAB
ALBUMIN SERPL-MCNC: 3.7 G/DL (ref 3.4–5)
ALBUMIN/GLOB SERPL: 1 {RATIO} (ref 1–2)
ALP LIVER SERPL-CCNC: 146 U/L
ALT SERPL-CCNC: 45 U/L
ANION GAP SERPL CALC-SCNC: 4 MMOL/L (ref 0–18)
AST SERPL-CCNC: 24 U/L (ref 15–37)
BASOPHILS # BLD AUTO: 0.06 X10(3) UL (ref 0–0.2)
BASOPHILS NFR BLD AUTO: 0.9 %
BILIRUB SERPL-MCNC: 0.4 MG/DL (ref 0.1–2)
BUN BLD-MCNC: 14 MG/DL (ref 7–18)
BUN/CREAT SERPL: 14.7 (ref 10–20)
CALCIUM BLD-MCNC: 9.3 MG/DL (ref 8.5–10.1)
CHLORIDE SERPL-SCNC: 101 MMOL/L (ref 98–112)
CHOLEST SMN-MCNC: 167 MG/DL (ref ?–200)
CO2 SERPL-SCNC: 29 MMOL/L (ref 21–32)
COMPLEXED PSA SERPL-MCNC: 3.82 NG/ML (ref ?–4)
CREAT BLD-MCNC: 0.95 MG/DL
CREAT UR-SCNC: 36.5 MG/DL
DEPRECATED RDW RBC AUTO: 35.8 FL (ref 35.1–46.3)
EOSINOPHIL # BLD AUTO: 0.23 X10(3) UL (ref 0–0.7)
EOSINOPHIL NFR BLD AUTO: 3.3 %
ERYTHROCYTE [DISTWIDTH] IN BLOOD BY AUTOMATED COUNT: 11.8 % (ref 11–15)
EST. AVERAGE GLUCOSE BLD GHB EST-MCNC: 148 MG/DL (ref 68–126)
GLOBULIN PLAS-MCNC: 3.6 G/DL (ref 2.8–4.4)
GLUCOSE BLD-MCNC: 136 MG/DL (ref 70–99)
HBA1C MFR BLD HPLC: 6.8 % (ref ?–5.7)
HCT VFR BLD AUTO: 42.3 %
HDLC SERPL-MCNC: 44 MG/DL (ref 40–59)
HGB BLD-MCNC: 14.6 G/DL
IMM GRANULOCYTES # BLD AUTO: 0.02 X10(3) UL (ref 0–1)
IMM GRANULOCYTES NFR BLD: 0.3 %
LDLC SERPL CALC-MCNC: 98 MG/DL (ref ?–100)
LYMPHOCYTES # BLD AUTO: 1.61 X10(3) UL (ref 1–4)
LYMPHOCYTES NFR BLD AUTO: 23.2 %
M PROTEIN MFR SERPL ELPH: 7.3 G/DL (ref 6.4–8.2)
MCH RBC QN AUTO: 29.4 PG (ref 26–34)
MCHC RBC AUTO-ENTMCNC: 34.5 G/DL (ref 31–37)
MCV RBC AUTO: 85.1 FL
MICROALBUMIN UR-MCNC: <0.5 MG/DL
MONOCYTES # BLD AUTO: 0.64 X10(3) UL (ref 0.1–1)
MONOCYTES NFR BLD AUTO: 9.2 %
NEUTROPHILS # BLD AUTO: 4.39 X10 (3) UL (ref 1.5–7.7)
NEUTROPHILS # BLD AUTO: 4.39 X10(3) UL (ref 1.5–7.7)
NEUTROPHILS NFR BLD AUTO: 63.1 %
NONHDLC SERPL-MCNC: 123 MG/DL (ref ?–130)
OSMOLALITY SERPL CALC.SUM OF ELEC: 281 MOSM/KG (ref 275–295)
PATIENT FASTING Y/N/NP: YES
PATIENT FASTING Y/N/NP: YES
PLATELET # BLD AUTO: 300 10(3)UL (ref 150–450)
POTASSIUM SERPL-SCNC: 4.2 MMOL/L (ref 3.5–5.1)
RBC # BLD AUTO: 4.97 X10(6)UL
SODIUM SERPL-SCNC: 134 MMOL/L (ref 136–145)
TRIGL SERPL-MCNC: 123 MG/DL (ref 30–149)
TSI SER-ACNC: 1.88 MIU/ML (ref 0.36–3.74)
VLDLC SERPL CALC-MCNC: 25 MG/DL (ref 0–30)
WBC # BLD AUTO: 7 X10(3) UL (ref 4–11)

## 2021-03-20 PROCEDURE — 82570 ASSAY OF URINE CREATININE: CPT

## 2021-03-20 PROCEDURE — 84443 ASSAY THYROID STIM HORMONE: CPT

## 2021-03-20 PROCEDURE — 83036 HEMOGLOBIN GLYCOSYLATED A1C: CPT

## 2021-03-20 PROCEDURE — 3061F NEG MICROALBUMINURIA REV: CPT | Performed by: INTERNAL MEDICINE

## 2021-03-20 PROCEDURE — 3044F HG A1C LEVEL LT 7.0%: CPT | Performed by: INTERNAL MEDICINE

## 2021-03-20 PROCEDURE — 80053 COMPREHEN METABOLIC PANEL: CPT

## 2021-03-20 PROCEDURE — 85025 COMPLETE CBC W/AUTO DIFF WBC: CPT

## 2021-03-20 PROCEDURE — 82043 UR ALBUMIN QUANTITATIVE: CPT

## 2021-03-20 PROCEDURE — 80061 LIPID PANEL: CPT

## 2021-03-20 PROCEDURE — 36415 COLL VENOUS BLD VENIPUNCTURE: CPT

## 2021-03-23 DIAGNOSIS — R97.20 PSA ELEVATION: Primary | ICD-10-CM

## 2021-03-23 DIAGNOSIS — R97.20 ELEVATED PSA: Primary | ICD-10-CM

## 2021-03-31 DIAGNOSIS — E11.9 DIABETES MELLITUS TYPE 2 WITHOUT RETINOPATHY (HCC): ICD-10-CM

## 2021-03-31 NOTE — TELEPHONE ENCOUNTER
Current Outpatient Medications:   •  metFORMIN HCl 1000 MG Oral Tab, Take 1 tablet (1,000 mg total) by mouth 2 (two) times daily. , Disp: 180 tablet, Rfl: 3

## 2021-03-31 NOTE — TELEPHONE ENCOUNTER
Pt calling to f/u on refill request sent by [employer] for Metformin. Rx pended for review/completion.

## 2021-05-06 ENCOUNTER — OFFICE VISIT (OUTPATIENT)
Dept: SURGERY | Facility: CLINIC | Age: 71
End: 2021-05-06
Payer: COMMERCIAL

## 2021-05-06 ENCOUNTER — LAB ENCOUNTER (OUTPATIENT)
Dept: LAB | Facility: HOSPITAL | Age: 71
End: 2021-05-06
Attending: UROLOGY
Payer: COMMERCIAL

## 2021-05-06 VITALS
BODY MASS INDEX: 29.12 KG/M2 | RESPIRATION RATE: 18 BRPM | HEIGHT: 71 IN | TEMPERATURE: 98 F | DIASTOLIC BLOOD PRESSURE: 68 MMHG | SYSTOLIC BLOOD PRESSURE: 156 MMHG | HEART RATE: 62 BPM | WEIGHT: 208 LBS

## 2021-05-06 DIAGNOSIS — R97.20 ELEVATED PSA: Primary | ICD-10-CM

## 2021-05-06 DIAGNOSIS — N40.1 BENIGN PROSTATIC HYPERPLASIA WITH LOWER URINARY TRACT SYMPTOMS, SYMPTOM DETAILS UNSPECIFIED: ICD-10-CM

## 2021-05-06 PROCEDURE — 99244 OFF/OP CNSLTJ NEW/EST MOD 40: CPT | Performed by: UROLOGY

## 2021-05-06 PROCEDURE — 3008F BODY MASS INDEX DOCD: CPT | Performed by: UROLOGY

## 2021-05-06 PROCEDURE — 3077F SYST BP >= 140 MM HG: CPT | Performed by: UROLOGY

## 2021-05-06 PROCEDURE — 81003 URINALYSIS AUTO W/O SCOPE: CPT

## 2021-05-06 PROCEDURE — 3078F DIAST BP <80 MM HG: CPT | Performed by: UROLOGY

## 2021-05-06 RX ORDER — TADALAFIL 5 MG/1
5 TABLET ORAL
Qty: 30 TABLET | Refills: 11 | Status: SHIPPED | OUTPATIENT
Start: 2021-05-06 | End: 2022-02-01

## 2021-05-06 NOTE — PROGRESS NOTES
SUBJECTIVE:  Edgar Fleming II is a 73year old male who presents for a consultation at the request of, and a copy of this note will be sent to, Dr. Leona Langley, for evaluation of  benign prostatic hyperplasia and elevated PSA.  He states that the proble of death @ Age 64   • Diabetes Father    • Breast Cancer Mother 61        Cause of death   • Melanoma Mother    • Cancer Brother 39        testicular   • Heart Attack Brother 62        Myocardial infarction   • Diabetes Brother    • Glaucoma Neg    • Macul grossly normal  Intact neurologically grossly    ASSESSMENT/PLAN  Elevated psa  (primary encounter diagnosis)  Benign prostatic hyperplasia with lower urinary tract symptoms, symptom details unspecified    Orders Placed This Encounter      Urinalysis, Rout

## 2021-06-17 ENCOUNTER — OFFICE VISIT (OUTPATIENT)
Dept: SURGERY | Facility: CLINIC | Age: 71
End: 2021-06-17
Payer: COMMERCIAL

## 2021-06-17 DIAGNOSIS — N52.9 ERECTILE DYSFUNCTION, UNSPECIFIED ERECTILE DYSFUNCTION TYPE: ICD-10-CM

## 2021-06-17 DIAGNOSIS — R97.20 ELEVATED PSA: Primary | ICD-10-CM

## 2021-06-17 DIAGNOSIS — N40.1 BENIGN PROSTATIC HYPERPLASIA WITH LOWER URINARY TRACT SYMPTOMS, SYMPTOM DETAILS UNSPECIFIED: ICD-10-CM

## 2021-06-17 PROCEDURE — 99213 OFFICE O/P EST LOW 20 MIN: CPT | Performed by: UROLOGY

## 2021-06-17 PROCEDURE — 51741 ELECTRO-UROFLOWMETRY FIRST: CPT | Performed by: UROLOGY

## 2021-06-17 PROCEDURE — 51798 US URINE CAPACITY MEASURE: CPT | Performed by: UROLOGY

## 2021-06-17 NOTE — PROGRESS NOTES
Padmini Caban is a 73year old male.     HPI:   Patient presents with:  Urinary Symptoms: uroflow bladder scan      81-year-old male presents in follow-up to a previous visit May 6, 2021 for BPH, lower urinary tract symptoms, erectile dysfunction, and tablet (1,000 mg total) by mouth 2 (two) times daily. 180 tablet 3   • ROSUVASTATIN CALCIUM 5 MG Oral Tab TAKE 1 TABLET BY MOUTH EVERY DAY EVERY NIGHT 90 tablet 1   • NIFEdipine ER 60 MG Oral Tablet 24 Hr Take 1 tablet (60 mg total) by mouth once daily.  [address] repeat his bladder scan for postvoid residual volume only. If it remains elevated, consider procedural management options for BPH. –Repeat PSA September 2021         Orders This Visit:  No orders of the defined types were placed in this encounter.       Meron Shea

## 2021-07-12 ENCOUNTER — TELEPHONE (OUTPATIENT)
Dept: GASTROENTEROLOGY | Facility: CLINIC | Age: 71
End: 2021-07-12

## 2021-07-12 NOTE — TELEPHONE ENCOUNTER
Patient outreach message received. Recall reminder letter mailed out to patient. \"Entered into Epic:Recall colon in 1 year per Dr. Chinmay Bailey.  Last Colon done 10/2/2020, next due 10/2/2021\"

## 2021-08-12 ENCOUNTER — TELEPHONE (OUTPATIENT)
Dept: INTERNAL MEDICINE CLINIC | Facility: CLINIC | Age: 71
End: 2021-08-12

## 2021-08-24 ENCOUNTER — OFFICE VISIT (OUTPATIENT)
Dept: SURGERY | Facility: CLINIC | Age: 71
End: 2021-08-24
Payer: COMMERCIAL

## 2021-08-24 VITALS
SYSTOLIC BLOOD PRESSURE: 156 MMHG | WEIGHT: 208 LBS | DIASTOLIC BLOOD PRESSURE: 70 MMHG | BODY MASS INDEX: 29.12 KG/M2 | HEIGHT: 71 IN | HEART RATE: 59 BPM

## 2021-08-24 DIAGNOSIS — R97.20 ELEVATED PSA: ICD-10-CM

## 2021-08-24 DIAGNOSIS — N40.1 BENIGN PROSTATIC HYPERPLASIA WITH LOWER URINARY TRACT SYMPTOMS, SYMPTOM DETAILS UNSPECIFIED: Primary | ICD-10-CM

## 2021-08-24 DIAGNOSIS — N52.9 ERECTILE DYSFUNCTION, UNSPECIFIED ERECTILE DYSFUNCTION TYPE: ICD-10-CM

## 2021-08-24 PROCEDURE — 99214 OFFICE O/P EST MOD 30 MIN: CPT | Performed by: UROLOGY

## 2021-08-24 PROCEDURE — 3078F DIAST BP <80 MM HG: CPT | Performed by: UROLOGY

## 2021-08-24 PROCEDURE — 3077F SYST BP >= 140 MM HG: CPT | Performed by: UROLOGY

## 2021-08-24 PROCEDURE — 3008F BODY MASS INDEX DOCD: CPT | Performed by: UROLOGY

## 2021-08-24 NOTE — PROGRESS NOTES
Melyssa Rowan is a 73year old male. HPI:   Patient presents with:  Urinary Symptoms: today is pt follow up; PVR  BPH: pt states he dealing with constipation       51-year-old male in follow-up to visit June 17, 2021.   He has a history of lower uri mouth daily as needed for Erectile Dysfunction. 30 tablet 11   • metFORMIN HCl 1000 MG Oral Tab Take 1 tablet (1,000 mg total) by mouth 2 (two) times daily.  180 tablet 3   • ROSUVASTATIN CALCIUM 5 MG Oral Tab TAKE 1 TABLET BY MOUTH EVERY DAY EVERY NIGHT 90 Visit:  No orders of the defined types were placed in this encounter.       Meds This Visit:  Requested Prescriptions      No prescriptions requested or ordered in this encounter       Imaging & Referrals:  None     8/24/2021  Saba Lynn MD

## 2021-09-20 ENCOUNTER — TELEPHONE (OUTPATIENT)
Dept: GASTROENTEROLOGY | Facility: CLINIC | Age: 71
End: 2021-09-20

## 2021-09-20 DIAGNOSIS — Z86.010 PERSONAL HISTORY OF COLONIC POLYPS: Primary | ICD-10-CM

## 2021-09-21 NOTE — TELEPHONE ENCOUNTER
[address] Endoscopy Report        Date of Procedure:  10/02/20        Preoperative Diagnosis:  1. Colorectal cancer screening  2.   Personal history of adenomatous colon polyps        Postoperative Diagnosis:  Multiple colon polyp cecum there was a 7-8 mm sessile polyp which was cold snare excised and retrieved. 2.  In the ascending colon there were #3 polyps measuring 5-7 mm in size. These were cold snare excised and retrieved.   3.  In the transverse colon there were #7 polyps me

## 2021-09-21 NOTE — TELEPHONE ENCOUNTER
Britney Amend-    Please advise on prep/orders if appropriate. Thank you!     Last Procedure, Date, MD:  10/02/20 colonoscopy with polypectomy with Dr. Oscar Cohen  Last Diagnosis: Multiple colon polyps (1#6)  Recalled for (mth/yrs): 1 year  Sedation used previo

## 2021-09-21 NOTE — TELEPHONE ENCOUNTER
Component   Ref Range & Units    Case Report   Surgical Pathology                                Case: [id number]                                   Authorizing Provider: Shivam Peguero MD   Collected:           10/02/2020 01:15 PM           Orderin polypectomy:  · Fragments of tubular adenomas and few fragments of unremarkable colonic mucosa (1.4 cm in maximum dimension in aggregate). · No evidence of severe dysplasia/carcinoma in situ or infiltrating carcinoma identified.     E.   Transverse colon p aggregate 1.7 x 1.0 x 0.3 cm. The specimen is submitted entirely in cassette E1.      Specimen F is submitted in formalin labeled “[place], descending colon polyp” and consists of one fragment of tan soft tissue measuring 0.5 cm in greatest dimension.  The

## 2021-09-23 RX ORDER — POLYETHYLENE GLYCOL 3350, SODIUM CHLORIDE, SODIUM BICARBONATE, POTASSIUM CHLORIDE 420; 11.2; 5.72; 1.48 G/4L; G/4L; G/4L; G/4L
POWDER, FOR SOLUTION ORAL
Qty: 4000 ML | Refills: 0 | Status: SHIPPED | OUTPATIENT
Start: 2021-09-23 | End: 2022-02-07

## 2021-09-23 NOTE — TELEPHONE ENCOUNTER
The patient's chart has been reviewed. Okay to schedule pt for 1 year CLN recall r/t hx colon polyps with Dr. Ivon Denney.      Advise IV Twilight or MAC sedation with split dose  Colyte/TriLyte or equivalent(eRx) preparation.   -Eligible for NE: Yes r/t B

## 2021-09-29 DIAGNOSIS — I10 ESSENTIAL HYPERTENSION: ICD-10-CM

## 2021-09-29 RX ORDER — NIFEDIPINE 60 MG/1
60 TABLET, FILM COATED, EXTENDED RELEASE ORAL DAILY
Qty: 90 TABLET | Refills: 0 | Status: SHIPPED | OUTPATIENT
Start: 2021-09-29 | End: 2021-10-25

## 2021-09-29 NOTE — TELEPHONE ENCOUNTER
90 day refill given on 09/29/21, appointment needed for further refills. Please review. Protocol failed / No protocol.    Requested Prescriptions   Pending Prescriptions Disp Refills    NIFEDIPINE ER 60 MG Oral Tablet 24 Hr [Pharmacy Med Name: NIFEDIPINE 60MG ER (CC) TABLETS] 90 tablet 3     Sig: TAKE 1 TABLET(60 MG) BY MOUTH EVERY DAY        Hypertensive Medications Protocol Failed - 9/29/2021  2:00 PM        Failed - Appointment in past 6 or next 3 months        Passed - CMP or BMP in past 12 months        Passed - GFR Non- > 50     Lab Results   Component Value Date    GFRNAA 80 03/20/2021                      Future Appointments         Provider Department Appt Notes    In 2 weeks Aubrie Ferraro MD [facility] Ophthalmology EP - DM EE    In 1 month [facility], [address] [place] [address], [facility] cysto+trus per [employer]           Recent Outpatient Visits              1 month ago Benign prostatic hyperplasia with lower urinary tract symptoms, symptom details unspecified    [facility] Milagro Melissa MD    Office Visit    3 months ago Elevated PSA    [facility] Milagro Melissa [facility]    Office Visit    4 months ago Elevated PSA    [facility] Milagro Melissa MD    Office Visit    1 year ago History of colon polyps    [address], [facility] [place], STEPHANIE Deluna    Office Visit    1 year ago Physical exam    Jude Ace MD    Office Visit

## 2021-10-09 ENCOUNTER — PATIENT MESSAGE (OUTPATIENT)
Dept: GASTROENTEROLOGY | Facility: CLINIC | Age: 71
End: 2021-10-09

## 2021-10-11 ENCOUNTER — TELEPHONE (OUTPATIENT)
Dept: GASTROENTEROLOGY | Facility: CLINIC | Age: 71
End: 2021-10-11

## 2021-10-11 NOTE — TELEPHONE ENCOUNTER
From: Andriy Alamo II  To: STEPHANIE Pulliam  Sent: 10/9/2021  5:47 PM CDT  Subject: colonoscopy appointment    I was having trouble setting an appointment for my colonoscopy. Can you help. The only day I cannot take off is Wednesdays.  I have 2 pendi

## 2021-10-11 NOTE — TELEPHONE ENCOUNTER
Schedulers-    Please see patient's message below regarding scheduling colonoscopy. (TE dated 9/20/21. Patient was not available at the time.)    Thank you!

## 2021-10-11 NOTE — TELEPHONE ENCOUNTER
From: Rukhsana Clark II  To: STEPHANIE Sena  Sent: 10/9/2021 5:47 PM CDT  Subject: colonoscopy appointment    I was having trouble setting an appointment for my colonoscopy. Can you help. The only day I cannot take off is Wednesdays.  I have 2 pendin

## 2021-10-18 DIAGNOSIS — I10 ESSENTIAL HYPERTENSION: ICD-10-CM

## 2021-10-18 NOTE — TELEPHONE ENCOUNTER
Scheduled for:  Colonoscopy - [id number]  Provider Name:  Dr. Ab Quezada  Date:  2/7/22 (pt added to wait list)  Location:  [facility]  Sedation:  IV  Time:  11:15 am (pt is aware to arrive at 10:15 am)  Prep:  Trilyte, Prep instructions were given to pt over the ph

## 2021-10-19 ENCOUNTER — OFFICE VISIT (OUTPATIENT)
Dept: OPHTHALMOLOGY | Facility: CLINIC | Age: 71
End: 2021-10-19
Payer: COMMERCIAL

## 2021-10-19 DIAGNOSIS — E11.9 DIABETES MELLITUS TYPE 2 WITHOUT RETINOPATHY (HCC): Primary | ICD-10-CM

## 2021-10-19 DIAGNOSIS — H25.13 AGE-RELATED NUCLEAR CATARACT OF BOTH EYES: ICD-10-CM

## 2021-10-19 PROCEDURE — 92004 COMPRE OPH EXAM NEW PT 1/>: CPT | Performed by: OPHTHALMOLOGY

## 2021-10-19 RX ORDER — VALSARTAN 320 MG/1
320 TABLET ORAL DAILY
Qty: 90 TABLET | Refills: 1 | Status: SHIPPED | OUTPATIENT
Start: 2021-10-19 | End: 2022-01-17

## 2021-10-19 NOTE — TELEPHONE ENCOUNTER
Refilled per [address] [place] protocol.   Requested Prescriptions   Pending Prescriptions Disp Refills    VALSARTAN 320 MG Oral Tab [Pharmacy Med Name: VALSARTAN 320MG TABLETS] 90 tablet 3     Sig: TAKE 1 TABLET(320 MG) BY MOUTH DAILY        Hypertensive Med

## 2021-10-19 NOTE — PROGRESS NOTES
Scot Barnett is a 73year old male.     HPI:     HPI     Diabetic Eye Exam      Additional comments: Pt has been a diabetic for 10 years       Pt's diabetes is currently controlled by pills  Pt checks BS as needed   Pt's last blood sugar was 145  Last Soln Take prep as directed by gastro office. May substitute with Trilyte/generic equivalent if needed 4000 mL 0   • tadalafil 5 MG Oral Tab Take 1 tablet (5 mg total) by mouth daily as needed for Erectile Dysfunction.  30 tablet 11   • metFORMIN HCl 1000 MG Dermatochalasis, Meibomian gland dysfunction Dermatochalasis, Meibomian gland dysfunction    Conjunctiva/Sclera Nasal pinguecula Normal    Cornea Clear Clear    Anterior Chamber Deep and quiet Deep and quiet    Iris Normal Normal    Lens 1+ Nuclear scleros

## 2021-10-19 NOTE — ASSESSMENT & PLAN NOTE
Discussed mild cataracts in both eyes that are not affecting vision and are not surgical at this time. Suggest +2.00 over the counter glasses for reading and +1.00 or +1.25 for computer work.

## 2021-10-19 NOTE — PATIENT INSTRUCTIONS
Diabetes mellitus type 2 without retinopathy ([facility] [address]  Diabetes type II: no background of retinopathy, no signs of neovascularization noted. Discussed ocular and systemic benefits of blood sugar control.   Diagnosis and treatment discussed in detail with souleymane

## 2021-10-25 ENCOUNTER — OFFICE VISIT (OUTPATIENT)
Dept: INTERNAL MEDICINE CLINIC | Facility: CLINIC | Age: 71
End: 2021-10-25
Payer: COMMERCIAL

## 2021-10-25 VITALS
BODY MASS INDEX: 27.72 KG/M2 | HEART RATE: 64 BPM | DIASTOLIC BLOOD PRESSURE: 75 MMHG | SYSTOLIC BLOOD PRESSURE: 157 MMHG | WEIGHT: 198 LBS | HEIGHT: 71 IN

## 2021-10-25 DIAGNOSIS — F31.9 BIPOLAR 1 DISORDER (HCC): ICD-10-CM

## 2021-10-25 DIAGNOSIS — E78.2 MIXED HYPERLIPIDEMIA: ICD-10-CM

## 2021-10-25 DIAGNOSIS — E11.9 DIABETES MELLITUS TYPE 2 WITHOUT RETINOPATHY (HCC): ICD-10-CM

## 2021-10-25 DIAGNOSIS — B35.1 ONYCHOMYCOSIS: ICD-10-CM

## 2021-10-25 DIAGNOSIS — I10 ESSENTIAL HYPERTENSION: ICD-10-CM

## 2021-10-25 DIAGNOSIS — I10 PRIMARY HYPERTENSION: Primary | ICD-10-CM

## 2021-10-25 DIAGNOSIS — S92.505A CLOSED NONDISPLACED FRACTURE OF PHALANX OF LESSER TOE OF LEFT FOOT, UNSPECIFIED PHALANX, INITIAL ENCOUNTER: ICD-10-CM

## 2021-10-25 PROCEDURE — 99214 OFFICE O/P EST MOD 30 MIN: CPT | Performed by: INTERNAL MEDICINE

## 2021-10-25 PROCEDURE — 3077F SYST BP >= 140 MM HG: CPT | Performed by: INTERNAL MEDICINE

## 2021-10-25 PROCEDURE — 90662 IIV NO PRSV INCREASED AG IM: CPT | Performed by: INTERNAL MEDICINE

## 2021-10-25 PROCEDURE — 3008F BODY MASS INDEX DOCD: CPT | Performed by: INTERNAL MEDICINE

## 2021-10-25 PROCEDURE — 3078F DIAST BP <80 MM HG: CPT | Performed by: INTERNAL MEDICINE

## 2021-10-25 PROCEDURE — 90471 IMMUNIZATION ADMIN: CPT | Performed by: INTERNAL MEDICINE

## 2021-10-25 RX ORDER — NIFEDIPINE 90 MG/1
90 TABLET, FILM COATED, EXTENDED RELEASE ORAL DAILY
Qty: 90 TABLET | Refills: 1 | Status: SHIPPED | OUTPATIENT
Start: 2021-10-25

## 2021-10-25 RX ORDER — ROSUVASTATIN CALCIUM 5 MG/1
5 TABLET, COATED ORAL NIGHTLY
Qty: 90 TABLET | Refills: 3 | Status: SHIPPED | OUTPATIENT
Start: 2021-10-25

## 2021-10-25 RX ORDER — ROSUVASTATIN CALCIUM 5 MG/1
5 TABLET, COATED ORAL NIGHTLY
Qty: 90 TABLET | Refills: 3 | Status: CANCELLED | OUTPATIENT
Start: 2021-10-25

## 2021-10-25 NOTE — PROGRESS NOTES
Ezella Bamberger is a 73year old male. Patient presents with:   Follow - Up: perscription refill, and correct pharmacy, bruised toe on the left foot      HPI:   PT COMES FOR F/U  C/C  HTN  C/O BP creeping back up   Dr Quezada Plants took him offf all meds , Medications   Medication Sig Dispense Refill   • influenza Vac High-Dose Quad 0.7 ML Intramuscular Suspension Prefilled Syringe Inject 0.7 mL into the muscle Prior to discharge.  0.7 mL 0   • rosuvastatin 5 MG Oral Tab Take 1 tablet (5 mg total) by mouth ni Vaping Use: Never used    Alcohol use: Yes      Comment: < 3 drinks/year    Drug use: No       REVIEW OF SYSTEMS:   GENERAL HEALTH: No fevers, chills, sweats, fatigue  VISION: No recent vision problems, blurry vision or double vision  RESPIRATORY: denies Solution; Apply 1 Application topically nightly.   -     PODIATRY - INTERNAL  Refill of his nail require and have him follow-up with the foot doctor    Closed nondisplaced fracture of phalanx of lesser toe of left foot, unspecified phalanx, initial encounte

## 2021-11-09 ENCOUNTER — LAB ENCOUNTER (OUTPATIENT)
Dept: LAB | Facility: HOSPITAL | Age: 71
End: 2021-11-09
Attending: INTERNAL MEDICINE
Payer: COMMERCIAL

## 2021-11-09 ENCOUNTER — HOSPITAL ENCOUNTER (OUTPATIENT)
Dept: GENERAL RADIOLOGY | Facility: HOSPITAL | Age: 71
Discharge: HOME | End: 2021-11-09
Attending: INTERNAL MEDICINE
Payer: COMMERCIAL

## 2021-11-09 ENCOUNTER — PROCEDURE (OUTPATIENT)
Dept: SURGERY | Facility: CLINIC | Age: 71
End: 2021-11-09
Payer: COMMERCIAL

## 2021-11-09 VITALS
RESPIRATION RATE: 16 BRPM | HEIGHT: 71 IN | WEIGHT: 198 LBS | SYSTOLIC BLOOD PRESSURE: 136 MMHG | DIASTOLIC BLOOD PRESSURE: 70 MMHG | HEART RATE: 76 BPM | BODY MASS INDEX: 27.72 KG/M2

## 2021-11-09 DIAGNOSIS — S92.505A CLOSED NONDISPLACED FRACTURE OF PHALANX OF LESSER TOE OF LEFT FOOT, UNSPECIFIED PHALANX, INITIAL ENCOUNTER: ICD-10-CM

## 2021-11-09 DIAGNOSIS — E11.9 DIABETES MELLITUS TYPE 2 WITHOUT RETINOPATHY (HCC): ICD-10-CM

## 2021-11-09 DIAGNOSIS — E78.2 MIXED HYPERLIPIDEMIA: ICD-10-CM

## 2021-11-09 DIAGNOSIS — N40.1 BENIGN PROSTATIC HYPERPLASIA WITH LOWER URINARY TRACT SYMPTOMS, SYMPTOM DETAILS UNSPECIFIED: Primary | ICD-10-CM

## 2021-11-09 DIAGNOSIS — N52.9 ERECTILE DYSFUNCTION, UNSPECIFIED ERECTILE DYSFUNCTION TYPE: ICD-10-CM

## 2021-11-09 DIAGNOSIS — F31.9 BIPOLAR 1 DISORDER (HCC): ICD-10-CM

## 2021-11-09 DIAGNOSIS — I10 ESSENTIAL HYPERTENSION: ICD-10-CM

## 2021-11-09 DIAGNOSIS — R97.20 ELEVATED PSA: ICD-10-CM

## 2021-11-09 PROCEDURE — 76872 US TRANSRECTAL: CPT | Performed by: UROLOGY

## 2021-11-09 PROCEDURE — 85027 COMPLETE CBC AUTOMATED: CPT

## 2021-11-09 PROCEDURE — 83036 HEMOGLOBIN GLYCOSYLATED A1C: CPT

## 2021-11-09 PROCEDURE — 36415 COLL VENOUS BLD VENIPUNCTURE: CPT

## 2021-11-09 PROCEDURE — 80061 LIPID PANEL: CPT

## 2021-11-09 PROCEDURE — 99214 OFFICE O/P EST MOD 30 MIN: CPT | Performed by: UROLOGY

## 2021-11-09 PROCEDURE — 3075F SYST BP GE 130 - 139MM HG: CPT | Performed by: UROLOGY

## 2021-11-09 PROCEDURE — 3008F BODY MASS INDEX DOCD: CPT | Performed by: UROLOGY

## 2021-11-09 PROCEDURE — 84443 ASSAY THYROID STIM HORMONE: CPT

## 2021-11-09 PROCEDURE — 3077F SYST BP >= 140 MM HG: CPT | Performed by: UROLOGY

## 2021-11-09 PROCEDURE — 73660 X-RAY EXAM OF TOE(S): CPT | Performed by: INTERNAL MEDICINE

## 2021-11-09 PROCEDURE — 80053 COMPREHEN METABOLIC PANEL: CPT

## 2021-11-09 PROCEDURE — 52000 CYSTOURETHROSCOPY: CPT | Performed by: UROLOGY

## 2021-11-09 PROCEDURE — 3078F DIAST BP <80 MM HG: CPT | Performed by: UROLOGY

## 2021-11-09 RX ORDER — CIPROFLOXACIN 500 MG/1
500 TABLET, FILM COATED ORAL ONCE
Status: COMPLETED | OUTPATIENT
Start: 2021-11-09 | End: 2021-11-09

## 2021-11-09 RX ADMIN — CIPROFLOXACIN 500 MG: 500 TABLET, FILM COATED ORAL at 14:30:00

## 2021-11-09 NOTE — PROGRESS NOTES
Britney Elder is a 73year old male.     HPI:   Patient presents with:  BPH: cystoscopy with transrectal ultrasound       19-year-old male presents for cystoscopy and transrectal ultrasound of the prostate in follow-up to previous visit August 24, 2021 Oral Tablet 24 Hr Take 1 tablet (90 mg total) by mouth daily. 90 tablet 1   • valsartan 320 MG Oral Tab Take 1 tablet (320 mg total) by mouth daily.  90 tablet 1   • PEG 3350-KCl-Na Bicarb-NaCl (TRILYTE) 420 g Oral Recon Soln Take prep as directed by gastro encounter diagnosis)  Elevated psa  Erectile dysfunction, unspecified erectile dysfunction type    Recommended:  -Discussed with the patient options for management including cystoscopy and greenlight laser vaporization of the prostate versus UroLift.   Risk

## 2021-11-10 DIAGNOSIS — S82.92XA: Primary | ICD-10-CM

## 2021-11-12 ENCOUNTER — TELEPHONE (OUTPATIENT)
Dept: PODIATRY CLINIC | Facility: CLINIC | Age: 71
End: 2021-11-12

## 2021-11-12 NOTE — TELEPHONE ENCOUNTER
pt. states that he has a fx 2nd toe on L Foot and that he is a diabetic. Pt. States that he has xray done at [address] [place] [address]. Pt. Is requesting to sched an appt. Sooner then 1st avail., pt.  Is aware that office closed at 4pm.

## 2021-11-15 NOTE — TELEPHONE ENCOUNTER
Called pt and he states about 2 wks ago he was walking and hit his left foot on the sidewalk. After that the 2nd toe had pain/swelling and bruising. He had XR on 11/9. He denies any pain now and states swelling has gone down since injury.  He denies any red

## 2021-11-16 ENCOUNTER — OFFICE VISIT (OUTPATIENT)
Dept: PODIATRY CLINIC | Facility: CLINIC | Age: 71
End: 2021-11-16
Payer: COMMERCIAL

## 2021-11-16 DIAGNOSIS — B35.1 ONYCHOMYCOSIS: ICD-10-CM

## 2021-11-16 DIAGNOSIS — S90.122A CONTUSION OF LESSER TOE OF LEFT FOOT WITHOUT DAMAGE TO NAIL, INITIAL ENCOUNTER: Primary | ICD-10-CM

## 2021-11-16 PROCEDURE — 99203 OFFICE O/P NEW LOW 30 MIN: CPT | Performed by: PODIATRIST

## 2021-11-16 RX ORDER — TERBINAFINE HYDROCHLORIDE 250 MG/1
250 TABLET ORAL DAILY
Qty: 90 TABLET | Refills: 0 | Status: SHIPPED | OUTPATIENT
Start: 2021-11-16

## 2021-11-16 NOTE — PROGRESS NOTES
HPI:    Patient ID: Jeromy Ospina is a 73year old male. [id number]-old male presents as a new patient to me on referral from 2021 [facility].  Patient states that he is here for an evaluation in reference to a broken foot.   Patient states that he injured his Application topically 2 (two) times daily as needed. 60 g 2   • Cholecalciferol 2000 UNITS Oral Cap Take 1 capsule by mouth daily. • Multiple Vitamins-Minerals (MULTI-VITAMIN/MINERALS) Oral Tab Take 1 tablet by mouth daily.      • ONETOUCH ULTRA BLUE In daily.       Imaging & Referrals:  None       #[id number]

## 2021-12-21 DIAGNOSIS — E11.9 DIABETES MELLITUS TYPE 2 WITHOUT RETINOPATHY (HCC): ICD-10-CM

## 2021-12-21 NOTE — TELEPHONE ENCOUNTER
Refill passed per [facility] [employer] [facility] protocol.      Requested Prescriptions   Pending Prescriptions Disp Refills    METFORMIN HCL 1000 MG Oral Tab [Pharmacy Med Name: METFORMIN 1000MG TABLETS] 180 tablet 3     Sig: TAKE 1 TABLET(1000 MG) BY MOUTH TWICE DAILY        Diabetes Medication Protocol Passed - 12/21/2021  8:01 AM        Passed - Last A1C < 7.5 and within past 6 months     Lab Results   Component Value Date    A1C 6.5 (H) 11/09/2021               Passed - Appointment in past 6 or next 3 months        Passed - GFR Non- > 50     Lab Results   Component Value Date    GFRNAA 87 11/09/2021                 Passed - GFR in the past 12 months                Recent Outpatient Visits              1 month ago Contusion of lesser toe of left foot without damage to nail, initial encounter    [facility], [address], [address] [place] [address]    Office Visit    1 month ago Primary hypertension    [facility], [address], Herlene Merlin, MD    Office Visit    2 months ago Diabetes mellitus type 2 without retinopathy [facility])    [facility] Ophthalmology Pa Foster MD    Office Visit    3 months ago Benign prostatic hyperplasia with lower urinary tract symptoms, symptom details unspecified    [facility], [address], Keturah Samaniego MD    Office Visit    6 months ago Elevated PSA    [facility] for Marissa Hansen MD    Office Visit             Future Appointments         Provider Department Appt Notes    In 1 month STATHOPOULOS, PROCEDURE [address] GI PROCEDURE Colon w/IV @ [address] [place] [address]

## 2021-12-26 DIAGNOSIS — I10 ESSENTIAL HYPERTENSION: ICD-10-CM

## 2021-12-27 RX ORDER — NIFEDIPINE 60 MG/1
60 TABLET, FILM COATED, EXTENDED RELEASE ORAL DAILY
Qty: 90 TABLET | Refills: 0 | Status: SHIPPED | OUTPATIENT
Start: 2021-12-27 | End: 2022-02-03

## 2021-12-27 NOTE — TELEPHONE ENCOUNTER
Refill passed per [employer] [facility] protocol.     Requested Prescriptions   Pending Prescriptions Disp Refills    NIFEDIPINE ER 60 MG Oral Tablet 24 Hr [Pharmacy Med Name: NIFEDIPINE 60MG ER (CC) TABLETS] 90 tablet 0     Sig: TAKE 1 TABLET(60 MG) BY MOUTH DAILY        Hypertensive Medications Protocol Passed - 12/27/2021  9:33 AM        Passed - CMP or BMP in past 12 months        Passed - Appointment in past 6 or next 3 months        Passed - GFR Non- > 50     Lab Results   Component Value Date    GFRNAA 87 11/09/2021                       Future Appointments         Provider Department Appt Notes    In 1 month STATHOPOULOS, PROCEDURE [address] GI PROCEDURE Colon w/IV @ [address] [facility]            Recent Outpatient Visits              1 month ago Contusion of lesser toe of left foot without damage to nail, initial encounter    [facility], [address], [place], [place]    Office Visit    2 months ago Primary hypertension    [place] [facility] [place], [facility], [address] MD    Office Visit    2 months ago Diabetes mellitus type 2 without retinopathy [facility])    [facility] Ophthalmology Junior Aguilar MD    Office Visit    4 months ago Benign prostatic hyperplasia with lower urinary tract symptoms, symptom details unspecified    [facility], [address], Gisela Locke MD    Office Visit    6 months ago Elevated PSA    [facility] for Micsusan Prieto MD    Office Visit

## 2022-01-01 DIAGNOSIS — I10 ESSENTIAL HYPERTENSION: ICD-10-CM

## 2022-01-01 RX ORDER — NIFEDIPINE 60 MG/1
TABLET, FILM COATED, EXTENDED RELEASE ORAL
Qty: 90 TABLET | Refills: 0 | OUTPATIENT
Start: 2022-01-01

## 2022-01-17 DIAGNOSIS — I10 ESSENTIAL HYPERTENSION: ICD-10-CM

## 2022-01-17 RX ORDER — VALSARTAN 320 MG/1
320 TABLET ORAL DAILY
Qty: 90 TABLET | Refills: 1 | Status: SHIPPED | OUTPATIENT
Start: 2022-01-17

## 2022-01-17 NOTE — TELEPHONE ENCOUNTER
Refill passed per [facility] protocol   Requested Prescriptions   Pending Prescriptions Disp Refills    VALSARTAN 320 MG Oral Tab [Pharmacy Med Name: VALSARTAN 320MG TABLETS] 90 tablet 1     Sig: TAKE 1 TABLET(320 MG) BY MOUTH DAILY        Hypertensiv

## 2022-02-01 RX ORDER — TADALAFIL 5 MG/1
TABLET ORAL
Qty: 30 TABLET | Refills: 3 | Status: SHIPPED | OUTPATIENT
Start: 2022-02-01

## 2022-02-03 ENCOUNTER — OFFICE VISIT (OUTPATIENT)
Dept: INTERNAL MEDICINE CLINIC | Facility: CLINIC | Age: 72
End: 2022-02-03
Payer: COMMERCIAL

## 2022-02-03 ENCOUNTER — LAB ENCOUNTER (OUTPATIENT)
Dept: LAB | Age: 72
End: 2022-02-03
Attending: INTERNAL MEDICINE
Payer: COMMERCIAL

## 2022-02-03 VITALS
HEART RATE: 69 BPM | SYSTOLIC BLOOD PRESSURE: 148 MMHG | RESPIRATION RATE: 17 BRPM | DIASTOLIC BLOOD PRESSURE: 67 MMHG | WEIGHT: 200 LBS | HEIGHT: 71 IN | BODY MASS INDEX: 28 KG/M2

## 2022-02-03 DIAGNOSIS — I10 PRIMARY HYPERTENSION: ICD-10-CM

## 2022-02-03 DIAGNOSIS — Z00.00 ENCOUNTER FOR ANNUAL HEALTH EXAMINATION: Primary | ICD-10-CM

## 2022-02-03 DIAGNOSIS — I70.0 ATHEROSCLEROSIS OF AORTA (HCC): ICD-10-CM

## 2022-02-03 DIAGNOSIS — Z80.8 FHX: MELANOMA: ICD-10-CM

## 2022-02-03 DIAGNOSIS — R12 HEARTBURN: ICD-10-CM

## 2022-02-03 DIAGNOSIS — E11.9 DIABETES MELLITUS TYPE 2 WITHOUT RETINOPATHY (HCC): ICD-10-CM

## 2022-02-03 DIAGNOSIS — H25.13 AGE-RELATED NUCLEAR CATARACT OF BOTH EYES: ICD-10-CM

## 2022-02-03 DIAGNOSIS — N40.1 BENIGN PROSTATIC HYPERPLASIA WITH URINARY FREQUENCY: ICD-10-CM

## 2022-02-03 DIAGNOSIS — F31.9 BIPOLAR 1 DISORDER (HCC): ICD-10-CM

## 2022-02-03 DIAGNOSIS — D22.9 NUMEROUS MOLES: ICD-10-CM

## 2022-02-03 DIAGNOSIS — R35.0 BENIGN PROSTATIC HYPERPLASIA WITH URINARY FREQUENCY: ICD-10-CM

## 2022-02-03 DIAGNOSIS — D69.2 SENILE PURPURA (HCC): ICD-10-CM

## 2022-02-03 DIAGNOSIS — E78.2 MIXED HYPERLIPIDEMIA: ICD-10-CM

## 2022-02-03 DIAGNOSIS — I10 ESSENTIAL HYPERTENSION: ICD-10-CM

## 2022-02-03 DIAGNOSIS — R60.0 LOCALIZED EDEMA: ICD-10-CM

## 2022-02-03 PROBLEM — S92.515A CLOSED NONDISPLACED FRACTURE OF PROXIMAL PHALANX OF LESSER TOE OF LEFT FOOT: Status: RESOLVED | Noted: 2019-01-24 | Resolved: 2022-02-03

## 2022-02-03 LAB
ALBUMIN SERPL-MCNC: 4 G/DL (ref 3.4–5)
ALBUMIN/GLOB SERPL: 1.3 {RATIO} (ref 1–2)
ALT SERPL-CCNC: 18 U/L
ANION GAP SERPL CALC-SCNC: 5 MMOL/L (ref 0–18)
AST SERPL-CCNC: 14 U/L (ref 15–37)
BILIRUB SERPL-MCNC: 0.3 MG/DL (ref 0.1–2)
BUN BLD-MCNC: 19 MG/DL (ref 7–18)
BUN/CREAT SERPL: 20.4 (ref 10–20)
CALCIUM BLD-MCNC: 9.7 MG/DL (ref 8.5–10.1)
CARTRIDGE LOT#: 844 NUMERIC
CHLORIDE SERPL-SCNC: 100 MMOL/L (ref 98–112)
CO2 SERPL-SCNC: 29 MMOL/L (ref 21–32)
CREAT BLD-MCNC: 0.93 MG/DL
CREAT UR-SCNC: 44.8 MG/DL
FASTING STATUS PATIENT QL REPORTED: YES
GLOBULIN PLAS-MCNC: 3.1 G/DL (ref 2.8–4.4)
GLUCOSE BLD-MCNC: 164 MG/DL (ref 70–99)
HEMOGLOBIN A1C: 6.8 % (ref 4.3–5.6)
MICROALBUMIN UR-MCNC: 0.57 MG/DL
MICROALBUMIN/CREAT 24H UR-RTO: 12.7 UG/MG (ref ?–30)
OSMOLALITY SERPL CALC.SUM OF ELEC: 284 MOSM/KG (ref 275–295)
POTASSIUM SERPL-SCNC: 4.2 MMOL/L (ref 3.5–5.1)
PROT SERPL-MCNC: 7.1 G/DL (ref 6.4–8.2)
SODIUM SERPL-SCNC: 134 MMOL/L (ref 136–145)

## 2022-02-03 PROCEDURE — 3078F DIAST BP <80 MM HG: CPT | Performed by: INTERNAL MEDICINE

## 2022-02-03 PROCEDURE — 82043 UR ALBUMIN QUANTITATIVE: CPT

## 2022-02-03 PROCEDURE — 82570 ASSAY OF URINE CREATININE: CPT

## 2022-02-03 PROCEDURE — 3061F NEG MICROALBUMINURIA REV: CPT | Performed by: INTERNAL MEDICINE

## 2022-02-03 PROCEDURE — 83036 HEMOGLOBIN GLYCOSYLATED A1C: CPT | Performed by: INTERNAL MEDICINE

## 2022-02-03 PROCEDURE — 3044F HG A1C LEVEL LT 7.0%: CPT | Performed by: INTERNAL MEDICINE

## 2022-02-03 PROCEDURE — 3077F SYST BP >= 140 MM HG: CPT | Performed by: INTERNAL MEDICINE

## 2022-02-03 PROCEDURE — 36415 COLL VENOUS BLD VENIPUNCTURE: CPT

## 2022-02-03 PROCEDURE — 80053 COMPREHEN METABOLIC PANEL: CPT

## 2022-02-03 PROCEDURE — 3008F BODY MASS INDEX DOCD: CPT | Performed by: INTERNAL MEDICINE

## 2022-02-03 PROCEDURE — G0438 PPPS, INITIAL VISIT: HCPCS | Performed by: INTERNAL MEDICINE

## 2022-02-03 RX ORDER — HYDRALAZINE HYDROCHLORIDE 25 MG/1
25 TABLET, FILM COATED ORAL 2 TIMES DAILY
Qty: 180 TABLET | Refills: 0 | Status: SHIPPED | OUTPATIENT
Start: 2022-02-03 | End: 2022-04-03

## 2022-02-04 RX ORDER — HYDRALAZINE HYDROCHLORIDE 25 MG/1
TABLET, FILM COATED ORAL
Qty: 180 TABLET | Refills: 0 | OUTPATIENT
Start: 2022-02-04

## 2022-02-05 ENCOUNTER — LAB ENCOUNTER (OUTPATIENT)
Dept: LAB | Facility: HOSPITAL | Age: 72
End: 2022-02-05
Attending: INTERNAL MEDICINE
Payer: COMMERCIAL

## 2022-02-05 DIAGNOSIS — Z01.818 PRE-OP TESTING: ICD-10-CM

## 2022-02-05 LAB — SARS-COV-2 RNA RESP QL NAA+PROBE: NOT DETECTED

## 2022-02-06 PROBLEM — I70.0 ATHEROSCLEROSIS OF AORTA (HCC): Status: ACTIVE | Noted: 2022-02-06

## 2022-02-06 PROBLEM — R60.0 LOCALIZED EDEMA: Status: ACTIVE | Noted: 2022-02-06

## 2022-02-06 PROBLEM — I70.0 ATHEROSCLEROSIS OF AORTA: Status: ACTIVE | Noted: 2022-02-06

## 2022-02-06 PROBLEM — R12 HEARTBURN: Status: ACTIVE | Noted: 2022-02-06

## 2022-02-07 ENCOUNTER — ANESTHESIA EVENT (OUTPATIENT)
Dept: ENDOSCOPY | Facility: HOSPITAL | Age: 72
End: 2022-02-07
Payer: COMMERCIAL

## 2022-02-07 ENCOUNTER — ANESTHESIA (OUTPATIENT)
Dept: ENDOSCOPY | Facility: HOSPITAL | Age: 72
End: 2022-02-07
Payer: COMMERCIAL

## 2022-02-07 ENCOUNTER — HOSPITAL ENCOUNTER (OUTPATIENT)
Facility: HOSPITAL | Age: 72
Setting detail: HOSPITAL OUTPATIENT SURGERY
Discharge: HOME | End: 2022-02-07
Attending: INTERNAL MEDICINE | Admitting: INTERNAL MEDICINE
Payer: COMMERCIAL

## 2022-02-07 VITALS
TEMPERATURE: 98 F | HEART RATE: 50 BPM | WEIGHT: 200 LBS | HEIGHT: 73 IN | SYSTOLIC BLOOD PRESSURE: 137 MMHG | DIASTOLIC BLOOD PRESSURE: 67 MMHG | BODY MASS INDEX: 26.51 KG/M2 | OXYGEN SATURATION: 98 % | RESPIRATION RATE: 12 BRPM

## 2022-02-07 DIAGNOSIS — Z01.818 PRE-OP TESTING: Primary | ICD-10-CM

## 2022-02-07 DIAGNOSIS — Z86.010 PERSONAL HISTORY OF COLONIC POLYPS: ICD-10-CM

## 2022-02-07 LAB — GLUCOSE BLDC GLUCOMTR-MCNC: 140 MG/DL (ref 70–99)

## 2022-02-07 PROCEDURE — 0DBM8ZX EXCISION OF DESCENDING COLON, VIA NATURAL OR ARTIFICIAL OPENING ENDOSCOPIC, DIAGNOSTIC: ICD-10-PCS | Performed by: INTERNAL MEDICINE

## 2022-02-07 PROCEDURE — 0DBL8ZX EXCISION OF TRANSVERSE COLON, VIA NATURAL OR ARTIFICIAL OPENING ENDOSCOPIC, DIAGNOSTIC: ICD-10-PCS | Performed by: INTERNAL MEDICINE

## 2022-02-07 PROCEDURE — 45385 COLONOSCOPY W/LESION REMOVAL: CPT | Performed by: INTERNAL MEDICINE

## 2022-02-07 PROCEDURE — 0DBN8ZX EXCISION OF SIGMOID COLON, VIA NATURAL OR ARTIFICIAL OPENING ENDOSCOPIC, DIAGNOSTIC: ICD-10-PCS | Performed by: INTERNAL MEDICINE

## 2022-02-07 RX ORDER — NALOXONE HYDROCHLORIDE 0.4 MG/ML
80 INJECTION, SOLUTION INTRAMUSCULAR; INTRAVENOUS; SUBCUTANEOUS AS NEEDED
Status: DISCONTINUED | OUTPATIENT
Start: 2022-02-07 | End: 2022-02-07

## 2022-02-07 RX ORDER — NICOTINE POLACRILEX 4 MG
15 LOZENGE BUCCAL
Status: DISCONTINUED | OUTPATIENT
Start: 2022-02-07 | End: 2022-02-07

## 2022-02-07 RX ORDER — SODIUM CHLORIDE, SODIUM LACTATE, POTASSIUM CHLORIDE, CALCIUM CHLORIDE 600; 310; 30; 20 MG/100ML; MG/100ML; MG/100ML; MG/100ML
INJECTION, SOLUTION INTRAVENOUS CONTINUOUS
Status: DISCONTINUED | OUTPATIENT
Start: 2022-02-07 | End: 2022-02-07

## 2022-02-07 RX ORDER — NICOTINE POLACRILEX 4 MG
30 LOZENGE BUCCAL
Status: DISCONTINUED | OUTPATIENT
Start: 2022-02-07 | End: 2022-02-07

## 2022-02-07 RX ORDER — DEXTROSE MONOHYDRATE 25 G/50ML
50 INJECTION, SOLUTION INTRAVENOUS
Status: DISCONTINUED | OUTPATIENT
Start: 2022-02-07 | End: 2022-02-07

## 2022-02-07 RX ADMIN — SODIUM CHLORIDE, SODIUM LACTATE, POTASSIUM CHLORIDE, CALCIUM CHLORIDE: 600; 310; 30; 20 INJECTION, SOLUTION INTRAVENOUS at 13:08:00

## 2022-02-07 NOTE — OPERATIVE REPORT
[facility] Endoscopy Report      Date of Procedure:  02/07/22      Preoperative Diagnosis:  Personal history of adenomatous colon polyps      Postoperative Diagnosis:  Colon polyps      Procedure:    Colonoscopy with polypectomy      Surgeon:  Adela Mendoza M.D. Anesthesia:  Monitored anesthesia care  Cecal withdrawal time: 39 minutes  EBL:  Insignificant      Brief History: This is a 73year old male who presents for a surveillance colonoscopy in the setting of a history of multiple adenomatous polyps (approaching 16) removed endoscopically a little over 1 year prior. The patient has had no new lower gastrointestinal tract symptoms or signs. Technique:  After informed consent, the patient was placed in the left lateral recumbent position. Digital rectal examination revealed no palpable intraluminal abnormalities. An Olympus variable stiffness 190 series HD colonoscope was inserted into the rectum and advanced under direct vision by following the lumen through a very tortuous colon to the terminal ileum. The colon was examined upon withdrawal in the left lateral recumbent position. Findings:  The preparation of the colon was fair. There was a moderate amount of inspissated bile-stained material and opaque fluid that required irrigation and suctioning. The terminal ileum was examined for several cm and visually normal.  The ileocecal valve was well preserved. The visualized colonic mucosa from the cecum to the anal verge was normal with an intact vascular pattern. There were #7 polyps seen within the colon which removed as follows:    1. In the proximal/mid transverse colon there were #3 polyps measuring 3-5 mm in size. These were cold snare excised and retrieved. 2.  In the descending colon there were #3 3-5 mm sessile polyps which were cold snare excised and retrieved.   3.  In the sigmoid colon there was a 4 mm sessile polyp which was cold snare excised and retrieved. Inspection of all sites revealed no evidence of ongoing bleeding. There were no other colonic polyps, definite diverticula, mass lesions, vascular anomalies or signs of inflammation seen. Retroflexion in the rectum revealed no abnormalities. The procedure was well tolerated without immediate complication. Impression:  1. Colon polyps  2. Otherwise normal colonoscopy to the terminal ileum    Recommendations: Follow-up biopsy results. Polyp histology to determine recommendations regarding follow-up.         Chiara Briseno MD  2/7/2022

## 2022-02-10 ENCOUNTER — TELEPHONE (OUTPATIENT)
Dept: GASTROENTEROLOGY | Facility: CLINIC | Age: 72
End: 2022-02-10

## 2022-02-10 NOTE — TELEPHONE ENCOUNTER
Patient calling regarding pathology report from colonoscopy performed on 02-07-22. Results not yet reviewed.

## 2022-02-11 NOTE — TELEPHONE ENCOUNTER
Recall colon in 2 years per Dr. Nikolas Chadwick. Last done:02-07-22  Next JDU:15-06-65    Updated health maintenance and pt outreach. Placed referral to medical genetics due to > #20 adenomatous colon polyps since 2007.

## 2022-02-11 NOTE — TELEPHONE ENCOUNTER
I spoke to the patient. He had several additional several adenomas removed. He had almost #16 adenomas removed last year. Records review reveals an additional several adenomas removed in 2007 and 2011. The patient's adenoma burden is therefore well over 20. His colonoscopic preparation was fair. I have recommended a surveillance colonoscopy in 2 years and have recommended a medical genetics evaluation. The patient is in agreement. GI RNs: Please enter colonoscopy recall for 2 years and please arrange for the patient to see medical genetics.

## 2022-02-15 ENCOUNTER — TELEPHONE (OUTPATIENT)
Dept: HEMATOLOGY/ONCOLOGY | Facility: HOSPITAL | Age: 72
End: 2022-02-15

## 2022-02-22 ENCOUNTER — TELEPHONE (OUTPATIENT)
Dept: HEMATOLOGY/ONCOLOGY | Facility: HOSPITAL | Age: 72
End: 2022-02-22

## 2022-03-03 ENCOUNTER — TELEPHONE (OUTPATIENT)
Dept: HEMATOLOGY/ONCOLOGY | Facility: HOSPITAL | Age: 72
End: 2022-03-03

## 2022-03-10 ENCOUNTER — TELEPHONE (OUTPATIENT)
Dept: HEMATOLOGY/ONCOLOGY | Facility: HOSPITAL | Age: 72
End: 2022-03-10

## 2022-04-01 RX ORDER — HYDROCHLOROTHIAZIDE 25 MG/1
25 TABLET ORAL DAILY
Qty: 90 TABLET | Refills: 1 | Status: SHIPPED | OUTPATIENT
Start: 2022-04-01

## 2022-04-01 NOTE — TELEPHONE ENCOUNTER
Refill passed per [facility] protocol    Requested Prescriptions   Pending Prescriptions Disp Refills    HYDROCHLOROTHIAZIDE 25 MG Oral Tab [Pharmacy Med Name: HYDROCHLOROTHIAZIDE 25MG TABLETS] 90 tablet 3     Sig: TAKE 1 TABLET(25 MG) BY MOUTH DAILY        Hypertensive Medications Protocol Passed - 3/31/2022  7:28 PM        Passed - CMP or BMP in past 12 months        Passed - Appointment in past 6 or next 3 months        Passed - GFR Non- > 50     Lab Results   Component Value Date    GFRNAA 83 02/03/2022                           Recent Outpatient Visits              1 month ago Encounter for annual health examination    [facility], [address] MD    Office Visit    4 months ago Contusion of lesser toe of left foot without damage to nail, initial encounter    [facility], [address], [address] [place] [address], [facility]    Office Visit    5 months ago Primary hypertension    [facility], [address] Peraza MD    Office Visit    5 months ago Diabetes mellitus type 2 without retinopathy [facility])    [facility] Ophthalmology Ramon Scott MD    Office Visit    7 months ago Benign prostatic hyperplasia with lower urinary tract symptoms, symptom details unspecified    [facility] Earl Urrutia MD    Office Visit

## 2022-04-02 DIAGNOSIS — I10 ESSENTIAL HYPERTENSION: ICD-10-CM

## 2022-04-03 RX ORDER — NIFEDIPINE 90 MG/1
TABLET, FILM COATED, EXTENDED RELEASE ORAL
Qty: 90 TABLET | Refills: 1 | Status: SHIPPED | OUTPATIENT
Start: 2022-04-03 | End: 2022-07-02

## 2022-04-03 RX ORDER — HYDRALAZINE HYDROCHLORIDE 25 MG/1
25 TABLET, FILM COATED ORAL 2 TIMES DAILY
Qty: 180 TABLET | Refills: 1 | Status: SHIPPED | OUTPATIENT
Start: 2022-04-03 | End: 2022-06-30

## 2022-04-03 NOTE — TELEPHONE ENCOUNTER
Refill passed per [employer] [facility] protocol.      Requested Prescriptions   Pending Prescriptions Disp Refills    HYDRALAZINE 25 MG Oral Tab [Pharmacy Med Name: HYDRALAZINE  25MG TABLETS(ORANGE)] 180 tablet 0     Sig: TAKE 1 TABLET(25 MG) BY MOUTH TWICE DAILY        Hypertensive Medications Protocol Passed - 4/2/2022 10:42 AM        Passed - CMP or BMP in past 12 months        Passed - Appointment in past 6 or next 3 months        Passed - GFR Non- > 50     Lab Results   Component Value Date    [facility] 02/03/[facility]                    NIFEDIPINE ER 90 MG Oral Tablet 24 Hr [Pharmacy Med Name: NIFEDIPINE 90MG ER (CC) TABLETS] 90 tablet 1     Sig: TAKE 1 TABLET(90 MG) BY MOUTH DAILY        Hypertensive Medications Protocol Passed - 4/2/2022 10:42 AM        Passed - CMP or BMP in past 12 months        Passed - Appointment in past 6 or next 3 months        Passed - GFR Non- > 50     Lab Results   Component Value Date    [facility] 83 02/03/2022                         Recent Outpatient Visits              1 month ago Encounter for annual health examination    [facility] [facility], [address], Paige Lombard, MD    Office Visit    4 months ago Contusion of lesser toe of left foot without damage to nail, initial encounter    [facility], [address], [address] [place] [address], [place], [place]    Office Visit    5 months ago Primary hypertension    [place] [employer], [facility], [address], Paige Lombard, MD    Office Visit    5 months ago Diabetes mellitus type 2 without retinopathy [facility])    [facility] Ophthalmology Deshawn Allen MD    Office Visit    7 months ago Benign prostatic hyperplasia with lower urinary tract symptoms, symptom details unspecified    [facility] for [address], Syed Seymour MD    Office Visit

## 2022-06-29 DIAGNOSIS — I10 ESSENTIAL HYPERTENSION: ICD-10-CM

## 2022-06-29 RX ORDER — HYDROCHLOROTHIAZIDE 25 MG/1
TABLET ORAL
Qty: 90 TABLET | Refills: 1 | Status: SHIPPED | OUTPATIENT
Start: 2022-06-29

## 2022-06-30 DIAGNOSIS — I10 ESSENTIAL HYPERTENSION: ICD-10-CM

## 2022-06-30 DIAGNOSIS — E78.2 MIXED HYPERLIPIDEMIA: ICD-10-CM

## 2022-06-30 DIAGNOSIS — E11.9 DIABETES MELLITUS TYPE 2 WITHOUT RETINOPATHY (HCC): ICD-10-CM

## 2022-06-30 RX ORDER — ROSUVASTATIN CALCIUM 5 MG/1
5 TABLET, COATED ORAL NIGHTLY
Qty: 90 TABLET | Refills: 1 | Status: SHIPPED | OUTPATIENT
Start: 2022-06-30 | End: 2022-10-12

## 2022-06-30 RX ORDER — HYDRALAZINE HYDROCHLORIDE 25 MG/1
TABLET, FILM COATED ORAL
Qty: 180 TABLET | Refills: 1 | Status: SHIPPED | OUTPATIENT
Start: 2022-06-30 | End: 2022-12-15

## 2022-06-30 NOTE — TELEPHONE ENCOUNTER
Refill passed per [facility] [facility] protocol.     Requested Prescriptions   Pending Prescriptions Disp Refills    ROSUVASTATIN 5 MG Oral Tab [Pharmacy Med Name: ROSUVASTATIN 5MG TABLETS] 90 tablet 3     Sig: TAKE 1 TABLET(5 MG) BY MOUTH EVERY NIGHT        Cholesterol Medication Protocol Passed - 6/30/2022  8:01 AM        Passed - ALT in past 12 months        Passed - LDL in past 12 months        Passed - Last ALT < 80       Lab Results   Component Value Date    ALT 18 02/03/2022             Passed - Last LDL < 130     Lab Results   Component Value Date    LDL 71 11/09/2021               Passed - Appointment in past 12 or next 3 months           HYDRALAZINE 25 MG Oral Tab [Pharmacy Med Name: HYDRALAZINE  25MG TABLETS(ORANGE)] 180 tablet 1     Sig: TAKE 1 TABLET(25 MG) BY MOUTH TWICE DAILY        Hypertensive Medications Protocol Passed - 6/30/2022  8:01 AM        Passed - CMP or BMP in past 12 months        Passed - Appointment in past 6 or next 3 months        Passed - GFR Non- > 50     Lab Results   Component Value Date    GFRNAA 83 02/03/2022                       Recent Outpatient Visits              4 months ago Encounter for annual health examination    [facility], [address] MD    Office Visit    7 months ago Contusion of lesser toe of left foot without damage to nail, initial encounter    [facility], [address], [address] [place] [address]    Office Visit    8 months ago Primary hypertension    [place] [employer] [facility], [address] MD    Office Visit    8 months ago Diabetes mellitus type 2 without retinopathy [facility])    [facility] Ophthalmology Estell Koyanagi, MD    Office Visit    10 months ago Benign prostatic hyperplasia with lower urinary tract symptoms, symptom details unspecified    [facility] for [place] Reed, Sonia Hudson MD    Office Visit

## 2022-07-02 DIAGNOSIS — I10 ESSENTIAL HYPERTENSION: ICD-10-CM

## 2022-07-02 RX ORDER — NIFEDIPINE 90 MG/1
TABLET, FILM COATED, EXTENDED RELEASE ORAL
Qty: 90 TABLET | Refills: 1 | Status: SHIPPED | OUTPATIENT
Start: 2022-07-02

## 2022-10-11 ENCOUNTER — PATIENT MESSAGE (OUTPATIENT)
Dept: INTERNAL MEDICINE CLINIC | Facility: CLINIC | Age: 72
End: 2022-10-11

## 2022-10-11 DIAGNOSIS — E11.9 DIABETES MELLITUS TYPE 2 WITHOUT RETINOPATHY (HCC): ICD-10-CM

## 2022-10-11 DIAGNOSIS — E78.2 MIXED HYPERLIPIDEMIA: ICD-10-CM

## 2022-10-11 DIAGNOSIS — I10 ESSENTIAL HYPERTENSION: ICD-10-CM

## 2022-10-12 ENCOUNTER — PATIENT MESSAGE (OUTPATIENT)
Dept: INTERNAL MEDICINE CLINIC | Facility: CLINIC | Age: 72
End: 2022-10-12

## 2022-10-12 RX ORDER — NIFEDIPINE 90 MG/1
90 TABLET, FILM COATED, EXTENDED RELEASE ORAL DAILY
Qty: 90 TABLET | Refills: 0 | Status: SHIPPED | OUTPATIENT
Start: 2022-10-12

## 2022-10-12 RX ORDER — ROSUVASTATIN CALCIUM 5 MG/1
5 TABLET, COATED ORAL NIGHTLY
Qty: 90 TABLET | Refills: 0 | Status: SHIPPED | OUTPATIENT
Start: 2022-10-12

## 2022-10-12 NOTE — TELEPHONE ENCOUNTER
From: Callum Chamberlain II  To: Nestor Jimenez MD  Sent: 10/11/2022 6:35 PM CDT  Subject: Need medication referrals. Dr. Kendra Case,   We just returned from vacation and I realized I am out of refills and needing some very quick. 1. Metformin - 3 days supply  2. Nephedipine -90s, about 1 week. 3. Rosouvastatin - maybe 2 weeks. Please send updates to [place] on [place] [address]. We plan to come in for physicals soon but cannot immediately. Recent BP and BS have been good  BP ranges from 126/72 to 150/80. Interesting reading at [facility] blood drive though gave diastolic as 24 and had another nurse verifiy the 126/72 reading.     Thanks,   Zaria Conley

## 2022-11-05 DIAGNOSIS — I10 ESSENTIAL HYPERTENSION: ICD-10-CM

## 2022-11-08 RX ORDER — VALSARTAN 320 MG/1
TABLET ORAL
Qty: 90 TABLET | Refills: 1 | Status: SHIPPED | OUTPATIENT
Start: 2022-11-08

## 2022-12-15 ENCOUNTER — OFFICE VISIT (OUTPATIENT)
Dept: INTERNAL MEDICINE CLINIC | Facility: CLINIC | Age: 72
End: 2022-12-15
Payer: COMMERCIAL

## 2022-12-15 ENCOUNTER — LAB ENCOUNTER (OUTPATIENT)
Dept: LAB | Age: 72
End: 2022-12-15
Attending: INTERNAL MEDICINE
Payer: COMMERCIAL

## 2022-12-15 VITALS
RESPIRATION RATE: 18 BRPM | BODY MASS INDEX: 26.64 KG/M2 | WEIGHT: 201 LBS | DIASTOLIC BLOOD PRESSURE: 82 MMHG | SYSTOLIC BLOOD PRESSURE: 128 MMHG | HEART RATE: 76 BPM | OXYGEN SATURATION: 98 % | HEIGHT: 73 IN

## 2022-12-15 DIAGNOSIS — E55.9 VITAMIN D DEFICIENCY: ICD-10-CM

## 2022-12-15 DIAGNOSIS — E11.9 DIABETES MELLITUS TYPE 2 WITHOUT RETINOPATHY (HCC): ICD-10-CM

## 2022-12-15 DIAGNOSIS — B35.1 ONYCHOMYCOSIS: ICD-10-CM

## 2022-12-15 DIAGNOSIS — I10 ESSENTIAL HYPERTENSION: ICD-10-CM

## 2022-12-15 DIAGNOSIS — E78.2 MIXED HYPERLIPIDEMIA: ICD-10-CM

## 2022-12-15 DIAGNOSIS — I10 ESSENTIAL HYPERTENSION: Primary | ICD-10-CM

## 2022-12-15 DIAGNOSIS — B35.3 TINEA PEDIS OF BOTH FEET: ICD-10-CM

## 2022-12-15 LAB
ALBUMIN SERPL-MCNC: 3.8 G/DL (ref 3.4–5)
ALBUMIN/GLOB SERPL: 1.1 {RATIO} (ref 1–2)
ALP LIVER SERPL-CCNC: 126 U/L
ALT SERPL-CCNC: 21 U/L
ANION GAP SERPL CALC-SCNC: 8 MMOL/L (ref 0–18)
AST SERPL-CCNC: 14 U/L (ref 15–37)
BILIRUB SERPL-MCNC: 0.5 MG/DL (ref 0.1–2)
BUN BLD-MCNC: 24 MG/DL (ref 7–18)
BUN/CREAT SERPL: 20 (ref 10–20)
CALCIUM BLD-MCNC: 9.5 MG/DL (ref 8.5–10.1)
CHLORIDE SERPL-SCNC: 101 MMOL/L (ref 98–112)
CHOLEST SERPL-MCNC: 123 MG/DL (ref ?–200)
CO2 SERPL-SCNC: 24 MMOL/L (ref 21–32)
CREAT BLD-MCNC: 1.2 MG/DL
DEPRECATED RDW RBC AUTO: 38.7 FL (ref 35.1–46.3)
ERYTHROCYTE [DISTWIDTH] IN BLOOD BY AUTOMATED COUNT: 11.9 % (ref 11–15)
EST. AVERAGE GLUCOSE BLD GHB EST-MCNC: 169 MG/DL (ref 68–126)
FASTING PATIENT LIPID ANSWER: YES
FASTING STATUS PATIENT QL REPORTED: YES
GFR SERPLBLD BASED ON 1.73 SQ M-ARVRAT: 65 ML/MIN/1.73M2 (ref 60–?)
GLOBULIN PLAS-MCNC: 3.6 G/DL (ref 2.8–4.4)
GLUCOSE BLD-MCNC: 199 MG/DL (ref 70–99)
HBA1C MFR BLD: 7.5 % (ref ?–5.7)
HCT VFR BLD AUTO: 41.8 %
HDLC SERPL-MCNC: 35 MG/DL (ref 40–59)
HGB BLD-MCNC: 14.3 G/DL
LDLC SERPL CALC-MCNC: 69 MG/DL (ref ?–100)
MCH RBC QN AUTO: 30 PG (ref 26–34)
MCHC RBC AUTO-ENTMCNC: 34.2 G/DL (ref 31–37)
MCV RBC AUTO: 87.8 FL
NONHDLC SERPL-MCNC: 88 MG/DL (ref ?–130)
OSMOLALITY SERPL CALC.SUM OF ELEC: 286 MOSM/KG (ref 275–295)
PLATELET # BLD AUTO: 331 10(3)UL (ref 150–450)
POTASSIUM SERPL-SCNC: 4.4 MMOL/L (ref 3.5–5.1)
PROT SERPL-MCNC: 7.4 G/DL (ref 6.4–8.2)
RBC # BLD AUTO: 4.76 X10(6)UL
SODIUM SERPL-SCNC: 133 MMOL/L (ref 136–145)
TRIGL SERPL-MCNC: 101 MG/DL (ref 30–149)
TSI SER-ACNC: 2.67 MIU/ML (ref 0.36–3.74)
VIT D+METAB SERPL-MCNC: 54.2 NG/ML (ref 30–100)
VLDLC SERPL CALC-MCNC: 15 MG/DL (ref 0–30)
WBC # BLD AUTO: 7.9 X10(3) UL (ref 4–11)

## 2022-12-15 PROCEDURE — 80061 LIPID PANEL: CPT

## 2022-12-15 PROCEDURE — 3074F SYST BP LT 130 MM HG: CPT | Performed by: INTERNAL MEDICINE

## 2022-12-15 PROCEDURE — 84443 ASSAY THYROID STIM HORMONE: CPT

## 2022-12-15 PROCEDURE — 85027 COMPLETE CBC AUTOMATED: CPT

## 2022-12-15 PROCEDURE — 36415 COLL VENOUS BLD VENIPUNCTURE: CPT

## 2022-12-15 PROCEDURE — 3008F BODY MASS INDEX DOCD: CPT | Performed by: INTERNAL MEDICINE

## 2022-12-15 PROCEDURE — 82306 VITAMIN D 25 HYDROXY: CPT

## 2022-12-15 PROCEDURE — 3079F DIAST BP 80-89 MM HG: CPT | Performed by: INTERNAL MEDICINE

## 2022-12-15 PROCEDURE — 83036 HEMOGLOBIN GLYCOSYLATED A1C: CPT

## 2022-12-15 PROCEDURE — 99214 OFFICE O/P EST MOD 30 MIN: CPT | Performed by: INTERNAL MEDICINE

## 2022-12-15 PROCEDURE — 80053 COMPREHEN METABOLIC PANEL: CPT

## 2022-12-15 RX ORDER — NIFEDIPINE 90 MG/1
90 TABLET, FILM COATED, EXTENDED RELEASE ORAL DAILY
Qty: 90 TABLET | Refills: 3 | Status: SHIPPED | OUTPATIENT
Start: 2022-12-15

## 2022-12-15 RX ORDER — ROSUVASTATIN CALCIUM 5 MG/1
5 TABLET, COATED ORAL NIGHTLY
Qty: 90 TABLET | Refills: 3 | Status: SHIPPED | OUTPATIENT
Start: 2022-12-15

## 2022-12-15 RX ORDER — CLOTRIMAZOLE AND BETAMETHASONE DIPROPIONATE 10; .64 MG/G; MG/G
1 CREAM TOPICAL 2 TIMES DAILY PRN
Qty: 60 G | Refills: 2 | Status: SHIPPED | OUTPATIENT
Start: 2022-12-15

## 2022-12-15 RX ORDER — HYDROCHLOROTHIAZIDE 25 MG/1
25 TABLET ORAL DAILY
Qty: 90 TABLET | Refills: 1 | Status: SHIPPED | OUTPATIENT
Start: 2022-12-15

## 2022-12-15 RX ORDER — VALSARTAN 320 MG/1
320 TABLET ORAL DAILY
Qty: 90 TABLET | Refills: 3 | Status: SHIPPED | OUTPATIENT
Start: 2022-12-15

## 2022-12-15 RX ORDER — HYDRALAZINE HYDROCHLORIDE 25 MG/1
25 TABLET, FILM COATED ORAL 2 TIMES DAILY
Qty: 180 TABLET | Refills: 1 | Status: SHIPPED | OUTPATIENT
Start: 2022-12-15

## 2022-12-27 DIAGNOSIS — E11.9 DIABETES MELLITUS TYPE 2 WITHOUT RETINOPATHY (HCC): Primary | ICD-10-CM

## 2023-04-30 DIAGNOSIS — I10 ESSENTIAL HYPERTENSION: ICD-10-CM

## 2023-05-01 RX ORDER — HYDRALAZINE HYDROCHLORIDE 25 MG/1
25 TABLET, FILM COATED ORAL 2 TIMES DAILY
Qty: 180 TABLET | Refills: 3 | Status: SHIPPED | OUTPATIENT
Start: 2023-05-01

## 2023-05-01 NOTE — TELEPHONE ENCOUNTER
Refill passed per [employer], [facility] protocol    Requested Prescriptions   Pending Prescriptions Disp Refills    HYDRALAZINE 25 MG Oral Tab [Pharmacy Med Name: HYDRALAZINE  25MG TABLETS(ORANGE)] 180 tablet 1     Sig: TAKE 1 TABLET(25 MG) BY MOUTH TWICE DAILY       Hypertensive Medications Protocol Passed - 4/30/2023  3:27 AM        Passed - In person appointment in the past 12 or next 3 months     Recent Outpatient Visits              4 months ago Essential hypertension    Chaka Hargrove Bentley Medina, MD    Office Visit    1 year ago Encounter for annual health examination    Chato Hargrove MD    Office Visit    1 year ago Contusion of lesser toe of left foot without damage to nail, initial encounter    [address] [facility], Vincenzo Su DPM    Office Visit    1 year ago Primary hypertension    Chaka Hargrove Bentley Medina, MD    Office Visit    1 year ago [facility] mellitus type 2 [facility]    [address], [address] [facility]    Office Visit                      Passed - Last BP reading less than 140/90     BP Readings from Last 1 Encounters:  12/15/22 : 128/82                Passed - CMP or BMP in past 6 months     Recent Results (from the past 4392 hour(s))   COMP METABOLIC PANEL (14)    Collection Time: 12/15/22 10:24 AM   Result Value Ref Range    Glucose 199 (H) 70 - 99 mg/dL    Sodium 133 (L) 136 - 145 mmol/L    Potassium 4.4 3.5 - 5.1 mmol/L    Chloride 101 98 - 112 mmol/L    CO2 24.0 21.0 - 32.0 mmol/L    Anion Gap 8 0 - 18 mmol/L    BUN 24 (H) 7 - 18 mg/dL    Creatinine 1.20 0.70 - 1.30 mg/dL    BUN/CREA Ratio 20.0 10.0 - 20.0    Calcium, Total 9.5 8.5 - 10.1 mg/dL    Calculated Osmolality 286 275 - 295 mOsm/kg    eGFR-Cr 65 >=60 mL/min/1.73m2    ALT 21 16 - 61 U/L    AST 14 (L) 15 - 37 U/L    Alkaline Phosphatase 126 (H) 45 - 117 U/L    Bilirubin, Total 0.5 0.1 - 2.0 mg/dL    Total Protein 7.4 6.4 - 8.2 g/dL    Albumin 3.8 3.4 - 5.0 g/dL    Globulin  3.6 2.8 - 4.4 g/dL    A/G Ratio 1.1 1.0 - 2.0    Patient Fasting for CMP? Yes      *Note: Due to a large number of results and/or encounters for the requested time period, some results have not been displayed. A complete set of results can be found in Results Review.                  Passed - In person appointment or virtual visit in the past 6 months     Recent Outpatient Visits              4 months ago Essential hypertension    Cherelle Sanders MD    Office Visit    1 year ago Encounter for annual health examination    Cherelle Sanders MD    Office Visit    1 year ago Contusion of lesser toe of left foot without damage to nail, initial encounter    Guido Peterson DPM    Office Visit    1 year ago Primary hypertension    Guido Sanders MD    Office Visit    1 year ago Diabetes mellitus type 2 without retinopathy [facility])    Leonid Peterson MD    Office Visit                      Passed - [facility] or GFRNAA > 50     GFR Evaluation  EGFRCR: 72 , resulted on 12/15/2022

## 2023-06-14 ENCOUNTER — PATIENT MESSAGE (OUTPATIENT)
Dept: OPHTHALMOLOGY | Facility: CLINIC | Age: 73
End: 2023-06-14

## 2023-06-14 DIAGNOSIS — I10 ESSENTIAL HYPERTENSION: ICD-10-CM

## 2023-06-14 RX ORDER — HYDROCHLOROTHIAZIDE 25 MG/1
25 TABLET ORAL DAILY
Qty: 90 TABLET | Refills: 1 | Status: SHIPPED | OUTPATIENT
Start: 2023-06-14

## 2023-06-14 NOTE — TELEPHONE ENCOUNTER
Refill passed per [employer], [facility] protocol.     Last office visit 12/15/22    Requested Prescriptions   Pending Prescriptions Disp Refills    HYDROCHLOROTHIAZIDE 25 MG Oral Tab [Pharmacy Med Name: HYDROCHLOROTHIAZIDE 25MG TABLETS] 90 tablet 1     Sig: TAKE 1 TABLET(25 MG) BY MOUTH DAILY       Hypertensive Medications Protocol Failed - 6/14/2023  8:26 AM        Failed - In person appointment or virtual visit in the past 6 months  Office Visit    12/15/2022  Mima Buchanan MD  Internal Medicine Essential hypertension +5 more  Dx Hypertension  Reason for Visit        Recent Outpatient Visits              6 months ago Essential hypertension    Jimena Buchanan MD    Office Visit    1 year ago Encounter for annual health examination    Jimena Buchanna MD    Office Visit    1 year ago Contusion of lesser toe of left foot without damage to nail, initial encounter    [address] [facility], Guido Lewis DPM    Office Visit    1 year ago Primary hypertension    Guido Buchanan MD    Office Visit    1 year ago Diabetes mellitus type 2 without retinopathy ([facility]    [address], Rickie Joseph MD    Office Visit          Future Appointments         Provider Department Appt Notes    In 2 months Pancho Gilmore MD [address], [address], [place] BPH    In 3 months Naveen Barnes MD [address], [facility]               Passed - In person appointment in the past 12 or next 3 months     Recent Outpatient Visits              6 months ago Essential hypertension    Guido Buchanan MD    Office Visit    1 year ago Encounter for annual health examination    Maribeth Mishra MD    Office Visit    1 year ago Contusion of lesser toe of left foot without damage to nail, initial encounter    [address], [address], [place], [place]    Office Visit    1 year ago Primary hypertension    Maribeth Mishra MD    Office Visit    1 year ago Diabetes mellitus type 2 without retinopathy ([facility]    [address], [address], JeanieCynthia alvarez MD    Office Visit          Future Appointments         Provider Department Appt Notes    In 2 months Wanda Davis MD [address], [address], [facility]    In 3 months Malcolm Perez MD [facility], [place] [address] [facility] DM               Passed - Last BP reading less than 140/90     BP Readings from Last 1 Encounters:  12/15/22 : 128/82              Passed - CMP or BMP in past 6 months     Recent Results (from the past 4392 hour(s))   COMP METABOLIC PANEL (14)    Collection Time: 12/15/22 10:24 AM   Result Value Ref Range    Glucose 199 (H) 70 - 99 mg/dL    Sodium 133 (L) 136 - 145 mmol/L    Potassium 4.4 3.5 - 5.1 mmol/L    Chloride 101 98 - 112 mmol/L    CO2 24.0 21.0 - 32.0 mmol/L    Anion Gap 8 0 - 18 mmol/L    BUN 24 (H) 7 - 18 mg/dL    Creatinine 1.20 0.70 - 1.30 mg/dL    BUN/CREA Ratio 20.0 10.0 - 20.0    Calcium, Total 9.5 8.5 - 10.1 mg/dL    Calculated Osmolality 286 275 - 295 mOsm/kg    eGFR-Cr 65 >=60 mL/min/1.73m2    ALT 21 16 - 61 U/L    AST 14 (L) 15 - 37 U/L    Alkaline Phosphatase 126 (H) 45 - 117 U/L    Bilirubin, Total 0.5 0.1 - 2.0 mg/dL    Total Protein 7.4 6.4 - 8.2 g/dL    Albumin 3.8 3.4 - 5.0 g/dL    Globulin  3.6 2.8 - 4.4 g/dL    A/G Ratio 1.1 1.0 - 2.0    Patient Fasting for CMP?  Yes      *Note: Due to a large number of results and/or encounters for the requested time period, some results have not been displayed. A complete set of results can be found in Results Review.                Passed - EGFRCR or GFRNAA > 50     GFR Evaluation  EGFRCR: 65 , resulted on 12/15/2022             Future Appointments         Provider Department Appt Notes    In 2 months MD Esthela Alves [place] [facility]    In 3 months MD Reji Dubon, [facility] DM          Recent Outpatient Visits              6 months ago Essential hypertension    Guido Walsh MD    Office Visit    1 year ago Encounter for annual health examination    Juanito Mart, Laney Fuentes MD    Office Visit    1 year ago Contusion of lesser toe of left foot without damage to nail, initial encounter    Ledon Maxcy, [place] Auther [place], DPM    Office Visit    1 year ago Primary hypertension    Guido Walsh MD    Office Visit    1 year ago Diabetes mellitus type 2 without retinopathy [facility])    Esthela Norris, Andra Deluna MD    Office Visit

## 2023-06-15 NOTE — TELEPHONE ENCOUNTER
From: Richard Castle II  To: Milton Camacho MD  Sent: 6/14/2023 2:31 PM CDT  Subject: Need to schedule overdue visual exam     Next available appointment

## 2023-08-09 ENCOUNTER — OFFICE VISIT (OUTPATIENT)
Dept: OPHTHALMOLOGY | Facility: CLINIC | Age: 73
End: 2023-08-09

## 2023-08-09 DIAGNOSIS — E11.9 DIABETES MELLITUS TYPE 2 WITHOUT RETINOPATHY (HCC): Primary | ICD-10-CM

## 2023-08-09 DIAGNOSIS — H25.13 AGE-RELATED NUCLEAR CATARACT OF BOTH EYES: ICD-10-CM

## 2023-08-09 PROCEDURE — 92014 COMPRE OPH EXAM EST PT 1/>: CPT | Performed by: OPHTHALMOLOGY

## 2023-08-09 PROCEDURE — 1126F AMNT PAIN NOTED NONE PRSNT: CPT | Performed by: OPHTHALMOLOGY

## 2023-08-09 PROCEDURE — 92015 DETERMINE REFRACTIVE STATE: CPT | Performed by: OPHTHALMOLOGY

## 2023-08-09 NOTE — PATIENT INSTRUCTIONS
Diabetes mellitus type 2 without retinopathy ([facility]  Diabetes type II: no background of retinopathy, no signs of neovascularization noted. Discussed ocular and systemic benefits of blood sugar control. Diagnosis and treatment discussed in detail with patient. Will see patient in 1 year for a diabetic exam    Age-related nuclear cataract of both eyes  Discussed mild cataracts in both eyes that are not affecting vision and are not surgical at this time.       New glasses Rx given today for progressive bifocals, recommend update
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
## 2023-08-09 NOTE — ASSESSMENT & PLAN NOTE
Discussed mild cataracts in both eyes that are not affecting vision and are not surgical at this time.       New glasses Rx given today for progressive bifocals, recommend update

## 2023-08-14 ENCOUNTER — APPOINTMENT (OUTPATIENT)
Dept: GENERAL RADIOLOGY | Facility: HOSPITAL | Age: 73
End: 2023-08-14
Payer: COMMERCIAL

## 2023-08-14 ENCOUNTER — HOSPITAL ENCOUNTER (EMERGENCY)
Facility: HOSPITAL | Age: 73
Discharge: HOME | End: 2023-08-15
Attending: EMERGENCY MEDICINE
Payer: COMMERCIAL

## 2023-08-14 DIAGNOSIS — J06.9 VIRAL URI WITH COUGH: Primary | ICD-10-CM

## 2023-08-14 LAB
ALBUMIN SERPL-MCNC: 3.4 G/DL (ref 3.4–5)
ALP LIVER SERPL-CCNC: 203 U/L
ALT SERPL-CCNC: 33 U/L
ANION GAP SERPL CALC-SCNC: 8 MMOL/L (ref 0–18)
AST SERPL-CCNC: 62 U/L (ref 15–37)
BASOPHILS # BLD AUTO: 0.05 X10(3) UL (ref 0–0.2)
BASOPHILS NFR BLD AUTO: 0.4 %
BILIRUB DIRECT SERPL-MCNC: <0.1 MG/DL (ref 0–0.2)
BILIRUB SERPL-MCNC: 0.5 MG/DL (ref 0.1–2)
BILIRUB UR QL: NEGATIVE
BUN BLD-MCNC: 14 MG/DL (ref 7–18)
BUN/CREAT SERPL: 13.5 (ref 10–20)
CALCIUM BLD-MCNC: 10 MG/DL (ref 8.5–10.1)
CHLORIDE SERPL-SCNC: 99 MMOL/L (ref 98–112)
CLARITY UR: CLEAR
CO2 SERPL-SCNC: 26 MMOL/L (ref 21–32)
CREAT BLD-MCNC: 1.04 MG/DL
DEPRECATED RDW RBC AUTO: 36.4 FL (ref 35.1–46.3)
EGFRCR SERPLBLD CKD-EPI 2021: 77 ML/MIN/1.73M2 (ref 60–?)
EOSINOPHIL # BLD AUTO: 0.01 X10(3) UL (ref 0–0.7)
EOSINOPHIL NFR BLD AUTO: 0.1 %
ERYTHROCYTE [DISTWIDTH] IN BLOOD BY AUTOMATED COUNT: 11.9 % (ref 11–15)
GLUCOSE BLD-MCNC: 173 MG/DL (ref 70–99)
GLUCOSE UR-MCNC: NORMAL MG/DL
HCT VFR BLD AUTO: 34.3 %
HGB BLD-MCNC: 11.8 G/DL
HGB UR QL STRIP.AUTO: NEGATIVE
IMM GRANULOCYTES # BLD AUTO: 0.04 X10(3) UL (ref 0–1)
IMM GRANULOCYTES NFR BLD: 0.3 %
KETONES UR-MCNC: NEGATIVE MG/DL
LACTATE SERPL-SCNC: 1 MMOL/L (ref 0.4–2)
LEUKOCYTE ESTERASE UR QL STRIP.AUTO: NEGATIVE
LYMPHOCYTES # BLD AUTO: 1.17 X10(3) UL (ref 1–4)
LYMPHOCYTES NFR BLD AUTO: 9.6 %
MCH RBC QN AUTO: 28.9 PG (ref 26–34)
MCHC RBC AUTO-ENTMCNC: 34.4 G/DL (ref 31–37)
MCV RBC AUTO: 83.9 FL
MONOCYTES # BLD AUTO: 1.07 X10(3) UL (ref 0.1–1)
MONOCYTES NFR BLD AUTO: 8.7 %
NEUTROPHILS # BLD AUTO: 9.91 X10 (3) UL (ref 1.5–7.7)
NEUTROPHILS # BLD AUTO: 9.91 X10(3) UL (ref 1.5–7.7)
NEUTROPHILS NFR BLD AUTO: 80.9 %
NITRITE UR QL STRIP.AUTO: NEGATIVE
OSMOLALITY SERPL CALC.SUM OF ELEC: 281 MOSM/KG (ref 275–295)
PH UR: 6.5 [PH] (ref 5–8)
PLATELET # BLD AUTO: 299 10(3)UL (ref 150–450)
POTASSIUM SERPL-SCNC: 3.6 MMOL/L (ref 3.5–5.1)
PROT SERPL-MCNC: 7.5 G/DL (ref 6.4–8.2)
PROT UR-MCNC: NEGATIVE MG/DL
RBC # BLD AUTO: 4.09 X10(6)UL
SODIUM SERPL-SCNC: 133 MMOL/L (ref 136–145)
SP GR UR STRIP: 1.01 (ref 1–1.03)
UROBILINOGEN UR STRIP-ACNC: NORMAL
WBC # BLD AUTO: 12.3 X10(3) UL (ref 4–11)

## 2023-08-14 PROCEDURE — 96360 HYDRATION IV INFUSION INIT: CPT

## 2023-08-14 PROCEDURE — 71045 X-RAY EXAM CHEST 1 VIEW: CPT | Performed by: EMERGENCY MEDICINE

## 2023-08-14 PROCEDURE — 83605 ASSAY OF LACTIC ACID: CPT | Performed by: EMERGENCY MEDICINE

## 2023-08-14 PROCEDURE — 80048 BASIC METABOLIC PNL TOTAL CA: CPT | Performed by: EMERGENCY MEDICINE

## 2023-08-14 PROCEDURE — 99285 EMERGENCY DEPT VISIT HI MDM: CPT

## 2023-08-14 PROCEDURE — 99284 EMERGENCY DEPT VISIT MOD MDM: CPT

## 2023-08-14 PROCEDURE — 80076 HEPATIC FUNCTION PANEL: CPT | Performed by: EMERGENCY MEDICINE

## 2023-08-14 PROCEDURE — 81003 URINALYSIS AUTO W/O SCOPE: CPT | Performed by: EMERGENCY MEDICINE

## 2023-08-14 PROCEDURE — 85025 COMPLETE CBC W/AUTO DIFF WBC: CPT | Performed by: EMERGENCY MEDICINE

## 2023-08-14 PROCEDURE — 84145 PROCALCITONIN (PCT): CPT | Performed by: EMERGENCY MEDICINE

## 2023-08-14 RX ORDER — ACETAMINOPHEN 325 MG/1
650 TABLET ORAL ONCE
Status: COMPLETED | OUTPATIENT
Start: 2023-08-14 | End: 2023-08-14

## 2023-08-15 VITALS
HEIGHT: 71 IN | OXYGEN SATURATION: 98 % | HEART RATE: 87 BPM | BODY MASS INDEX: 26.6 KG/M2 | DIASTOLIC BLOOD PRESSURE: 73 MMHG | TEMPERATURE: 99 F | WEIGHT: 190 LBS | RESPIRATION RATE: 18 BRPM | SYSTOLIC BLOOD PRESSURE: 165 MMHG

## 2023-08-15 LAB — PROCALCITONIN SERPL-MCNC: 0.11 NG/ML (ref ?–0.16)

## 2023-08-15 RX ORDER — IBUPROFEN 600 MG/1
600 TABLET ORAL EVERY 8 HOURS PRN
Qty: 30 TABLET | Refills: 0 | Status: SHIPPED | OUTPATIENT
Start: 2023-08-15 | End: 2023-08-22

## 2023-08-15 RX ORDER — ACETAMINOPHEN 325 MG/1
650 TABLET ORAL EVERY 6 HOURS PRN
Qty: 30 TABLET | Refills: 0 | Status: SHIPPED | OUTPATIENT
Start: 2023-08-15

## 2023-08-15 NOTE — DISCHARGE INSTRUCTIONS
You can alternate 650 mg of Tylenol every 4 hours with 600 mg of ibuprofen to keep the temperatures down. When your fever is down you are likely to feel much better. If you are still having symptoms of fever for more than 4 to 5 days, please return to the hospital for further work-up.

## 2023-08-15 NOTE — ED INITIAL ASSESSMENT (HPI)
Pt c/o fever, dry cough, also reports being poked by bushes while doing yard work, has scratches on the left forearm.

## 2023-08-24 ENCOUNTER — TELEPHONE (OUTPATIENT)
Dept: SURGERY | Facility: CLINIC | Age: 73
End: 2023-08-24

## 2023-08-24 ENCOUNTER — OFFICE VISIT (OUTPATIENT)
Dept: SURGERY | Facility: CLINIC | Age: 73
End: 2023-08-24

## 2023-08-24 DIAGNOSIS — Z01.818 PREOP EXAMINATION: ICD-10-CM

## 2023-08-24 DIAGNOSIS — N40.1 BENIGN PROSTATIC HYPERPLASIA WITH LOWER URINARY TRACT SYMPTOMS, SYMPTOM DETAILS UNSPECIFIED: Primary | ICD-10-CM

## 2023-08-24 DIAGNOSIS — R33.9 INCOMPLETE BLADDER EMPTYING: ICD-10-CM

## 2023-08-24 DIAGNOSIS — N52.9 ERECTILE DYSFUNCTION, UNSPECIFIED ERECTILE DYSFUNCTION TYPE: ICD-10-CM

## 2023-08-24 DIAGNOSIS — N40.1 BPH WITH URINARY OBSTRUCTION: Primary | ICD-10-CM

## 2023-08-24 DIAGNOSIS — N13.8 BPH WITH URINARY OBSTRUCTION: Primary | ICD-10-CM

## 2023-08-24 DIAGNOSIS — R97.20 ELEVATED PSA: ICD-10-CM

## 2023-08-24 DIAGNOSIS — R39.89 OTHER SYMPTOMS INVOLVING URINARY SYSTEM: ICD-10-CM

## 2023-08-24 PROCEDURE — 51798 US URINE CAPACITY MEASURE: CPT | Performed by: UROLOGY

## 2023-08-24 PROCEDURE — 99214 OFFICE O/P EST MOD 30 MIN: CPT | Performed by: UROLOGY

## 2023-08-24 RX ORDER — TADALAFIL 5 MG/1
5 TABLET ORAL
Qty: 90 TABLET | Refills: 3 | Status: SHIPPED | OUTPATIENT
Start: 2023-08-24

## 2023-08-24 NOTE — PROGRESS NOTES
Patient seen in office, scheduled Cystoscopy, Urolif, Wednesday 09/06/2023, went over pre-op/lab instructions, sent to patient' my chart.
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
## 2023-08-30 ENCOUNTER — LAB ENCOUNTER (OUTPATIENT)
Dept: LAB | Facility: HOSPITAL | Age: 73
End: 2023-08-30
Attending: UROLOGY
Payer: COMMERCIAL

## 2023-08-30 ENCOUNTER — PATIENT MESSAGE (OUTPATIENT)
Dept: SURGERY | Facility: CLINIC | Age: 73
End: 2023-08-30

## 2023-08-30 ENCOUNTER — PATIENT MESSAGE (OUTPATIENT)
Dept: INTERNAL MEDICINE CLINIC | Facility: CLINIC | Age: 73
End: 2023-08-30

## 2023-08-30 DIAGNOSIS — Z01.818 PREOP EXAMINATION: ICD-10-CM

## 2023-08-30 DIAGNOSIS — R97.20 ELEVATED PSA: ICD-10-CM

## 2023-08-30 LAB
ANION GAP SERPL CALC-SCNC: 5 MMOL/L (ref 0–18)
BASOPHILS # BLD AUTO: 0.04 X10(3) UL (ref 0–0.2)
BASOPHILS NFR BLD AUTO: 0.5 %
BUN BLD-MCNC: 18 MG/DL (ref 7–18)
BUN/CREAT SERPL: 18.6 (ref 10–20)
CALCIUM BLD-MCNC: 12.5 MG/DL (ref 8.5–10.1)
CHLORIDE SERPL-SCNC: 98 MMOL/L (ref 98–112)
CO2 SERPL-SCNC: 31 MMOL/L (ref 21–32)
COMPLEXED PSA SERPL-MCNC: 5.02 NG/ML (ref ?–4)
CREAT BLD-MCNC: 0.97 MG/DL
DEPRECATED RDW RBC AUTO: 37.9 FL (ref 35.1–46.3)
EGFRCR SERPLBLD CKD-EPI 2021: 83 ML/MIN/1.73M2 (ref 60–?)
EOSINOPHIL # BLD AUTO: 0.11 X10(3) UL (ref 0–0.7)
EOSINOPHIL NFR BLD AUTO: 1.3 %
ERYTHROCYTE [DISTWIDTH] IN BLOOD BY AUTOMATED COUNT: 12 % (ref 11–15)
FASTING STATUS PATIENT QL REPORTED: NO
GLUCOSE BLD-MCNC: 161 MG/DL (ref 70–99)
HCT VFR BLD AUTO: 35.3 %
HGB BLD-MCNC: 11.5 G/DL
IMM GRANULOCYTES # BLD AUTO: 0.03 X10(3) UL (ref 0–1)
IMM GRANULOCYTES NFR BLD: 0.4 %
LYMPHOCYTES # BLD AUTO: 1.66 X10(3) UL (ref 1–4)
LYMPHOCYTES NFR BLD AUTO: 20.4 %
MCH RBC QN AUTO: 28 PG (ref 26–34)
MCHC RBC AUTO-ENTMCNC: 32.6 G/DL (ref 31–37)
MCV RBC AUTO: 85.9 FL
MONOCYTES # BLD AUTO: 1.03 X10(3) UL (ref 0.1–1)
MONOCYTES NFR BLD AUTO: 12.6 %
NEUTROPHILS # BLD AUTO: 5.28 X10 (3) UL (ref 1.5–7.7)
NEUTROPHILS # BLD AUTO: 5.28 X10(3) UL (ref 1.5–7.7)
NEUTROPHILS NFR BLD AUTO: 64.8 %
OSMOLALITY SERPL CALC.SUM OF ELEC: 283 MOSM/KG (ref 275–295)
PLATELET # BLD AUTO: 431 10(3)UL (ref 150–450)
POTASSIUM SERPL-SCNC: 3.7 MMOL/L (ref 3.5–5.1)
RBC # BLD AUTO: 4.11 X10(6)UL
SODIUM SERPL-SCNC: 134 MMOL/L (ref 136–145)
WBC # BLD AUTO: 8.2 X10(3) UL (ref 4–11)

## 2023-08-30 PROCEDURE — 93005 ELECTROCARDIOGRAM TRACING: CPT

## 2023-08-30 PROCEDURE — 87086 URINE CULTURE/COLONY COUNT: CPT | Performed by: UROLOGY

## 2023-08-30 PROCEDURE — 80048 BASIC METABOLIC PNL TOTAL CA: CPT

## 2023-08-30 PROCEDURE — 93010 ELECTROCARDIOGRAM REPORT: CPT | Performed by: INTERNAL MEDICINE

## 2023-08-30 PROCEDURE — 36415 COLL VENOUS BLD VENIPUNCTURE: CPT

## 2023-08-30 PROCEDURE — 85025 COMPLETE CBC W/AUTO DIFF WBC: CPT

## 2023-08-31 LAB
ATRIAL RATE: 71 BPM
P AXIS: 44 DEGREES
P-R INTERVAL: 138 MS
Q-T INTERVAL: 374 MS
QRS DURATION: 80 MS
QTC CALCULATION (BEZET): 406 MS
R AXIS: 20 DEGREES
T AXIS: 48 DEGREES
VENTRICULAR RATE: 71 BPM

## 2023-09-01 ENCOUNTER — TELEPHONE (OUTPATIENT)
Dept: SURGERY | Facility: CLINIC | Age: 73
End: 2023-09-01

## 2023-09-01 NOTE — TELEPHONE ENCOUNTER
Nancy Chavez representative from [employer] toll free call wasn't able to provide me with phone number  requesting a prior authorization for procedure that's already schedule for 9/6/2023 patient is also requesting the prior authorization please keep Patient updated.

## 2023-09-07 ENCOUNTER — TELEPHONE (OUTPATIENT)
Dept: INTERNAL MEDICINE CLINIC | Facility: CLINIC | Age: 73
End: 2023-09-07

## 2023-09-07 ENCOUNTER — NURSE TRIAGE (OUTPATIENT)
Dept: INTERNAL MEDICINE CLINIC | Facility: CLINIC | Age: 73
End: 2023-09-07

## 2023-09-07 ENCOUNTER — TELEPHONE (OUTPATIENT)
Dept: SURGERY | Facility: CLINIC | Age: 73
End: 2023-09-07

## 2023-09-07 NOTE — TELEPHONE ENCOUNTER
Action Requested: Summary for Provider     []  Critical Lab, Recommendations Needed  [] Need Additional Advice  []   FYI    []   Need Orders  [] Need Medications Sent to Pharmacy  []  Other     SUMMARY: Per protocol advised : Office visit   Future Appointments   Date Time Provider Neto Mancilla   2023 10:30 AM [facility]   2023  8:15 AM Isaac Lo MD [address] [place] [address]   2023 11:40 AM [facility]   2023  9:00 AM Partha King MD [facility]       Reason for call: Acute  Onset: Data Unavailable    Called patient in regards to message below ( identified name and  )    Patient states about 3 weeks felt sweat, fever, runny nose, body fatigue  ( ) was tx with Resp virus     Remains with runny nose, also has  cough, very tired, voice is diminished and is the sound is trembling     Covid test X 3 and all were negative       See care advice given   COUGHING SPELLS: * Drink warm fluids. Inhale warm mist. This can help relax the airway and also loosen up phlegm. * Suck on cough drops or hard candy to coat the irritated throat. PREVENT DEHYDRATION: * Drink adequate liquids. * This will help soothe an irritated or dry throat and loosen up the phlegm. CALL BACK IF: * Difficulty breathing occurs * Fever lasts more than 3 days * Nasal discharge lasts more than 10 days * Cough lasts more than 3 weeks * You become worse    Patient verbalizes understanding and agrees with plan.      Reason for Disposition   Cough has been present for > 3 weeks    Protocols used: Cough-A-OH

## 2023-09-07 NOTE — TELEPHONE ENCOUNTER
Patient is requesting an order for an echocardiogram and mentions he thought PCP placed this order from last office visit but does not see it. Please advise patient.

## 2023-09-07 NOTE — TELEPHONE ENCOUNTER
Patient booked an appt via [facility] for the following:    Massive fatigue, shortness of breath, Weak Voice, Voice trembles, and all recent labs suggest anemia

## 2023-09-07 NOTE — TELEPHONE ENCOUNTER
Patient calling stating he was told to schedule a f/u with Dr Shira Esposito a month from 9/6/23 no openings available until November. Patient is not available Wednesdays.

## 2023-09-09 ENCOUNTER — LAB ENCOUNTER (OUTPATIENT)
Dept: LAB | Facility: HOSPITAL | Age: 73
End: 2023-09-09
Payer: COMMERCIAL

## 2023-09-09 ENCOUNTER — OFFICE VISIT (OUTPATIENT)
Dept: INTERNAL MEDICINE CLINIC | Facility: CLINIC | Age: 73
End: 2023-09-09

## 2023-09-09 VITALS
DIASTOLIC BLOOD PRESSURE: 62 MMHG | WEIGHT: 180 LBS | HEART RATE: 86 BPM | BODY MASS INDEX: 24.38 KG/M2 | HEIGHT: 72 IN | SYSTOLIC BLOOD PRESSURE: 120 MMHG | OXYGEN SATURATION: 96 %

## 2023-09-09 DIAGNOSIS — R53.83 FATIGUE, UNSPECIFIED TYPE: ICD-10-CM

## 2023-09-09 DIAGNOSIS — R05.8 POST-VIRAL COUGH SYNDROME: ICD-10-CM

## 2023-09-09 DIAGNOSIS — D64.9 ANEMIA, UNSPECIFIED TYPE: ICD-10-CM

## 2023-09-09 DIAGNOSIS — R60.0 LOWER EXTREMITY EDEMA: ICD-10-CM

## 2023-09-09 DIAGNOSIS — R19.7 DIARRHEA, UNSPECIFIED TYPE: ICD-10-CM

## 2023-09-09 DIAGNOSIS — R49.9 CHANGE IN VOICE: ICD-10-CM

## 2023-09-09 DIAGNOSIS — E11.9 DIABETES MELLITUS TYPE 2 WITHOUT RETINOPATHY (HCC): ICD-10-CM

## 2023-09-09 DIAGNOSIS — R06.09 DYSPNEA ON EXERTION: ICD-10-CM

## 2023-09-09 DIAGNOSIS — G25.2 RESTING TREMOR: Primary | ICD-10-CM

## 2023-09-09 DIAGNOSIS — K62.5 RECTAL BLEEDING: ICD-10-CM

## 2023-09-09 LAB
CONTROL LINE PRESENT WITH A CLEAR BACKGROUND (YES/NO): YES YES/NO
DEPRECATED HBV CORE AB SER IA-ACNC: 127.7 NG/ML
EST. AVERAGE GLUCOSE BLD GHB EST-MCNC: 180 MG/DL (ref 68–126)
FOLATE SERPL-MCNC: 16.9 NG/ML (ref 8.7–?)
HBA1C MFR BLD: 7.9 % (ref ?–5.7)
IRON SATN MFR SERPL: 7 %
IRON SERPL-MCNC: 23 UG/DL
KIT LOT #: 6668 NUMERIC
STREP GRP A CUL-SCR: NEGATIVE
TIBC SERPL-MCNC: 308 UG/DL (ref 240–450)
TRANSFERRIN SERPL-MCNC: 207 MG/DL (ref 200–360)
VIT B12 SERPL-MCNC: 553 PG/ML (ref 193–986)
VIT D+METAB SERPL-MCNC: 42.7 NG/ML (ref 30–100)

## 2023-09-09 PROCEDURE — 3078F DIAST BP <80 MM HG: CPT

## 2023-09-09 PROCEDURE — 3008F BODY MASS INDEX DOCD: CPT

## 2023-09-09 PROCEDURE — 82607 VITAMIN B-12: CPT

## 2023-09-09 PROCEDURE — 84466 ASSAY OF TRANSFERRIN: CPT

## 2023-09-09 PROCEDURE — 3051F HG A1C>EQUAL 7.0%<8.0%: CPT | Performed by: INTERNAL MEDICINE

## 2023-09-09 PROCEDURE — 3074F SYST BP LT 130 MM HG: CPT

## 2023-09-09 PROCEDURE — 87880 STREP A ASSAY W/OPTIC: CPT

## 2023-09-09 PROCEDURE — 82728 ASSAY OF FERRITIN: CPT

## 2023-09-09 PROCEDURE — 99214 OFFICE O/P EST MOD 30 MIN: CPT

## 2023-09-09 PROCEDURE — 36415 COLL VENOUS BLD VENIPUNCTURE: CPT

## 2023-09-09 PROCEDURE — 82306 VITAMIN D 25 HYDROXY: CPT

## 2023-09-09 PROCEDURE — 83036 HEMOGLOBIN GLYCOSYLATED A1C: CPT

## 2023-09-09 PROCEDURE — 83540 ASSAY OF IRON: CPT

## 2023-09-09 PROCEDURE — 82746 ASSAY OF FOLIC ACID SERUM: CPT

## 2023-09-09 PROCEDURE — 1126F AMNT PAIN NOTED NONE PRSNT: CPT

## 2023-09-10 ENCOUNTER — APPOINTMENT (OUTPATIENT)
Dept: LAB | Facility: HOSPITAL | Age: 73
End: 2023-09-10
Payer: COMMERCIAL

## 2023-09-10 PROCEDURE — 87493 C DIFF AMPLIFIED PROBE: CPT

## 2023-09-11 ENCOUNTER — LAB ENCOUNTER (OUTPATIENT)
Dept: LAB | Age: 73
End: 2023-09-11
Payer: COMMERCIAL

## 2023-09-11 DIAGNOSIS — R19.7 DIARRHEA, UNSPECIFIED TYPE: ICD-10-CM

## 2023-09-12 ENCOUNTER — OFFICE VISIT (OUTPATIENT)
Dept: ENDOCRINOLOGY CLINIC | Facility: CLINIC | Age: 73
End: 2023-09-12

## 2023-09-12 VITALS
BODY MASS INDEX: 26.91 KG/M2 | HEIGHT: 71 IN | WEIGHT: 192.19 LBS | SYSTOLIC BLOOD PRESSURE: 128 MMHG | HEART RATE: 90 BPM | DIASTOLIC BLOOD PRESSURE: 63 MMHG

## 2023-09-12 DIAGNOSIS — E11.65 TYPE 2 DIABETES MELLITUS WITH HYPERGLYCEMIA, WITHOUT LONG-TERM CURRENT USE OF INSULIN (HCC): Primary | ICD-10-CM

## 2023-09-12 DIAGNOSIS — I10 PRIMARY HYPERTENSION: ICD-10-CM

## 2023-09-12 DIAGNOSIS — E78.2 MIXED HYPERLIPIDEMIA: ICD-10-CM

## 2023-09-12 LAB
C DIFF TOX B STL QL: NEGATIVE
GLUCOSE BLOOD: 243
TEST STRIP LOT #: NORMAL NUMERIC

## 2023-09-12 PROCEDURE — 99244 OFF/OP CNSLTJ NEW/EST MOD 40: CPT | Performed by: INTERNAL MEDICINE

## 2023-09-12 PROCEDURE — 82947 ASSAY GLUCOSE BLOOD QUANT: CPT | Performed by: INTERNAL MEDICINE

## 2023-09-12 PROCEDURE — 3078F DIAST BP <80 MM HG: CPT | Performed by: INTERNAL MEDICINE

## 2023-09-12 PROCEDURE — 3008F BODY MASS INDEX DOCD: CPT | Performed by: INTERNAL MEDICINE

## 2023-09-12 PROCEDURE — 3074F SYST BP LT 130 MM HG: CPT | Performed by: INTERNAL MEDICINE

## 2023-09-12 RX ORDER — DULAGLUTIDE 0.75 MG/.5ML
0.75 INJECTION, SOLUTION SUBCUTANEOUS WEEKLY
Qty: 2 ML | Refills: 0 | Status: SHIPPED | OUTPATIENT
Start: 2023-09-12 | End: 2023-10-12

## 2023-09-18 DIAGNOSIS — I10 ESSENTIAL HYPERTENSION: ICD-10-CM

## 2023-09-18 RX ORDER — HYDROCHLOROTHIAZIDE 25 MG/1
25 TABLET ORAL DAILY
Qty: 90 TABLET | Refills: 3 | Status: SHIPPED | OUTPATIENT
Start: 2023-09-18

## 2023-09-20 ENCOUNTER — TELEPHONE (OUTPATIENT)
Facility: CLINIC | Age: 73
End: 2023-09-20

## 2023-09-20 NOTE — TELEPHONE ENCOUNTER
Unable to leave message, voice mailbox not set up. Will call after scheduling to verify pharmacy, home medications and allergies. Will send bowel prep once pharmacy is verified.

## 2023-09-20 NOTE — TELEPHONE ENCOUNTER
Author: Celina Stewart MD Service: -- Author Type: Physician   Filed: 9/19/2023  4:57 PM Encounter Date: 9/9/2023 Status: Signed   : Celina Stewart MD (Physician)     We will do [place]. GI RNs: Please reach out to the patient. May schedule a colonoscopy for a history of multiple colon polyps, alteration in bowel habits, fecal incontinence, rectal bleeding and anemia following a split dose TriLyte preparation and monitored anesthesia care. Hold Trulicity for 1 week preceding the procedure. The patient should also take 17 g of of MiraLAX daily for 4 days before the procedure.

## 2023-09-21 ENCOUNTER — HOSPITAL ENCOUNTER (OUTPATIENT)
Dept: CV DIAGNOSTICS | Facility: HOSPITAL | Age: 73
Discharge: HOME | DRG: 683 | End: 2023-09-21
Payer: COMMERCIAL

## 2023-09-21 ENCOUNTER — HOSPITAL ENCOUNTER (OUTPATIENT)
Dept: CV DIAGNOSTICS | Facility: HOSPITAL | Age: 73
Discharge: HOME | End: 2023-09-21
Payer: COMMERCIAL

## 2023-09-21 DIAGNOSIS — R06.09 DYSPNEA ON EXERTION: ICD-10-CM

## 2023-09-21 DIAGNOSIS — R53.83 FATIGUE, UNSPECIFIED TYPE: ICD-10-CM

## 2023-09-21 DIAGNOSIS — R60.0 LOWER EXTREMITY EDEMA: ICD-10-CM

## 2023-09-21 PROCEDURE — 93306 TTE W/DOPPLER COMPLETE: CPT

## 2023-09-22 ENCOUNTER — HOSPITAL ENCOUNTER (OUTPATIENT)
Age: 73
Discharge: ANOTHER HOSPITAL | DRG: 683 | End: 2023-09-22
Payer: COMMERCIAL

## 2023-09-22 ENCOUNTER — HOSPITAL ENCOUNTER (INPATIENT)
Facility: HOSPITAL | Age: 73
LOS: 1 days | Discharge: LEFT BEFORE DISCHARGE | End: 2023-09-23
Attending: FAMILY MEDICINE | Admitting: FAMILY MEDICINE
Payer: COMMERCIAL

## 2023-09-22 ENCOUNTER — APPOINTMENT (OUTPATIENT)
Dept: GENERAL RADIOLOGY | Facility: HOSPITAL | Age: 73
End: 2023-09-22
Attending: NURSE PRACTITIONER
Payer: COMMERCIAL

## 2023-09-22 ENCOUNTER — APPOINTMENT (OUTPATIENT)
Dept: CT IMAGING | Facility: HOSPITAL | Age: 73
End: 2023-09-22
Attending: NURSE PRACTITIONER
Payer: COMMERCIAL

## 2023-09-22 ENCOUNTER — APPOINTMENT (OUTPATIENT)
Dept: GENERAL RADIOLOGY | Facility: HOSPITAL | Age: 73
DRG: 683 | End: 2023-09-22
Attending: NURSE PRACTITIONER
Payer: COMMERCIAL

## 2023-09-22 ENCOUNTER — HOSPITAL ENCOUNTER (INPATIENT)
Facility: HOSPITAL | Age: 73
LOS: 1 days | Discharge: LEFT BEFORE DISCHARGE | DRG: 683 | End: 2023-09-23
Attending: FAMILY MEDICINE | Admitting: FAMILY MEDICINE
Payer: COMMERCIAL

## 2023-09-22 ENCOUNTER — APPOINTMENT (OUTPATIENT)
Dept: CT IMAGING | Facility: HOSPITAL | Age: 73
DRG: 683 | End: 2023-09-22
Attending: NURSE PRACTITIONER
Payer: COMMERCIAL

## 2023-09-22 ENCOUNTER — HOSPITAL ENCOUNTER (OUTPATIENT)
Age: 73
Discharge: ANOTHER HOSPITAL | End: 2023-09-22
Payer: COMMERCIAL

## 2023-09-22 VITALS
SYSTOLIC BLOOD PRESSURE: 138 MMHG | RESPIRATION RATE: 20 BRPM | BODY MASS INDEX: 26 KG/M2 | WEIGHT: 185 LBS | DIASTOLIC BLOOD PRESSURE: 55 MMHG | OXYGEN SATURATION: 97 % | HEART RATE: 68 BPM | TEMPERATURE: 98 F

## 2023-09-22 DIAGNOSIS — R06.00 DYSPNEA, UNSPECIFIED TYPE: ICD-10-CM

## 2023-09-22 DIAGNOSIS — R53.1 WEAKNESS: Primary | ICD-10-CM

## 2023-09-22 DIAGNOSIS — N17.9 AKI (ACUTE KIDNEY INJURY) (HCC): ICD-10-CM

## 2023-09-22 DIAGNOSIS — E83.52 HYPERCALCEMIA: Primary | ICD-10-CM

## 2023-09-22 LAB
ADENOVIRUS PCR:: NOT DETECTED
ALBUMIN SERPL-MCNC: 2.7 G/DL (ref 3.4–5)
ALP LIVER SERPL-CCNC: 283 U/L
ALT SERPL-CCNC: 49 U/L
ANION GAP SERPL CALC-SCNC: 9 MMOL/L (ref 0–18)
AST SERPL-CCNC: 51 U/L (ref 15–37)
B PARAPERT DNA SPEC QL NAA+PROBE: NOT DETECTED
B PERT DNA SPEC QL NAA+PROBE: NOT DETECTED
BASOPHILS # BLD AUTO: 0.06 X10(3) UL (ref 0–0.2)
BASOPHILS NFR BLD AUTO: 0.4 %
BILIRUB DIRECT SERPL-MCNC: 0.2 MG/DL (ref 0–0.2)
BILIRUB SERPL-MCNC: 0.6 MG/DL (ref 0.1–2)
BILIRUB UR QL: NEGATIVE
BUN BLD-MCNC: 67 MG/DL (ref 7–18)
BUN/CREAT SERPL: 18.9 (ref 10–20)
C PNEUM DNA SPEC QL NAA+PROBE: NOT DETECTED
CALCIUM BLD-MCNC: 16.5 MG/DL (ref 8.5–10.1)
CHLORIDE SERPL-SCNC: 97 MMOL/L (ref 98–112)
CLARITY UR: CLEAR
CO2 SERPL-SCNC: 25 MMOL/L (ref 21–32)
CORONAVIRUS 229E PCR:: NOT DETECTED
CORONAVIRUS HKU1 PCR:: NOT DETECTED
CORONAVIRUS NL63 PCR:: NOT DETECTED
CORONAVIRUS OC43 PCR:: NOT DETECTED
CREAT BLD-MCNC: 3.54 MG/DL
DEPRECATED RDW RBC AUTO: 35.3 FL (ref 35.1–46.3)
EGFRCR SERPLBLD CKD-EPI 2021: 18 ML/MIN/1.73M2 (ref 60–?)
EOSINOPHIL # BLD AUTO: 0.03 X10(3) UL (ref 0–0.7)
EOSINOPHIL NFR BLD AUTO: 0.2 %
ERYTHROCYTE [DISTWIDTH] IN BLOOD BY AUTOMATED COUNT: 12.4 % (ref 11–15)
FLUAV RNA SPEC QL NAA+PROBE: NOT DETECTED
FLUBV RNA SPEC QL NAA+PROBE: NOT DETECTED
GLUCOSE BLD-MCNC: 163 MG/DL (ref 70–99)
GLUCOSE UR-MCNC: NORMAL MG/DL
HCT VFR BLD AUTO: 32.4 %
HGB BLD-MCNC: 11.3 G/DL
HYALINE CASTS #/AREA URNS AUTO: PRESENT /LPF
IMM GRANULOCYTES # BLD AUTO: 0.08 X10(3) UL (ref 0–1)
IMM GRANULOCYTES NFR BLD: 0.6 %
INR BLD: 1.15 (ref 0.85–1.16)
KETONES UR-MCNC: NEGATIVE MG/DL
LEUKOCYTE ESTERASE UR QL STRIP.AUTO: NEGATIVE
LIPASE SERPL-CCNC: 20 U/L (ref 13–75)
LYMPHOCYTES # BLD AUTO: 1.21 X10(3) UL (ref 1–4)
LYMPHOCYTES NFR BLD AUTO: 8.9 %
MCH RBC QN AUTO: 27.3 PG (ref 26–34)
MCHC RBC AUTO-ENTMCNC: 34.9 G/DL (ref 31–37)
MCV RBC AUTO: 78.3 FL
METAPNEUMOVIRUS PCR:: NOT DETECTED
MONOCYTES # BLD AUTO: 1.53 X10(3) UL (ref 0.1–1)
MONOCYTES NFR BLD AUTO: 11.2 %
MYCOPLASMA PNEUMONIA PCR:: NOT DETECTED
NEUTROPHILS # BLD AUTO: 10.73 X10 (3) UL (ref 1.5–7.7)
NEUTROPHILS # BLD AUTO: 10.73 X10(3) UL (ref 1.5–7.7)
NEUTROPHILS NFR BLD AUTO: 78.7 %
NITRITE UR QL STRIP.AUTO: NEGATIVE
OSMOLALITY SERPL CALC.SUM OF ELEC: 295 MOSM/KG (ref 275–295)
PARAINFLUENZA 1 PCR:: NOT DETECTED
PARAINFLUENZA 2 PCR:: NOT DETECTED
PARAINFLUENZA 3 PCR:: NOT DETECTED
PARAINFLUENZA 4 PCR:: NOT DETECTED
PH UR: 5 [PH] (ref 5–8)
PLATELET # BLD AUTO: 476 10(3)UL (ref 150–450)
POTASSIUM SERPL-SCNC: 3.7 MMOL/L (ref 3.5–5.1)
PROT SERPL-MCNC: 7.2 G/DL (ref 6.4–8.2)
PROTHROMBIN TIME: 14.6 SECONDS (ref 11.6–14.8)
RBC # BLD AUTO: 4.14 X10(6)UL
RHINOVIRUS/ENTERO PCR:: NOT DETECTED
RSV RNA SPEC QL NAA+PROBE: NOT DETECTED
SARS-COV-2 RNA NPH QL NAA+NON-PROBE: NOT DETECTED
SODIUM SERPL-SCNC: 131 MMOL/L (ref 136–145)
SP GR UR STRIP: 1.01 (ref 1–1.03)
TROPONIN I HIGH SENSITIVITY: 20 NG/L
UROBILINOGEN UR STRIP-ACNC: NORMAL
WBC # BLD AUTO: 13.6 X10(3) UL (ref 4–11)

## 2023-09-22 PROCEDURE — 99214 OFFICE O/P EST MOD 30 MIN: CPT | Performed by: NURSE PRACTITIONER

## 2023-09-22 PROCEDURE — 99223 1ST HOSP IP/OBS HIGH 75: CPT | Performed by: FAMILY MEDICINE

## 2023-09-22 PROCEDURE — 70450 CT HEAD/BRAIN W/O DYE: CPT | Performed by: NURSE PRACTITIONER

## 2023-09-22 PROCEDURE — 71045 X-RAY EXAM CHEST 1 VIEW: CPT | Performed by: NURSE PRACTITIONER

## 2023-09-22 RX ORDER — PAMIDRONATE DISODIUM 3 MG/ML
90 INJECTION, SOLUTION INTRAVENOUS ONCE
Status: DISCONTINUED | OUTPATIENT
Start: 2023-09-22 | End: 2023-09-22

## 2023-09-23 ENCOUNTER — APPOINTMENT (OUTPATIENT)
Dept: ULTRASOUND IMAGING | Facility: HOSPITAL | Age: 73
End: 2023-09-23
Attending: INTERNAL MEDICINE
Payer: COMMERCIAL

## 2023-09-23 ENCOUNTER — APPOINTMENT (OUTPATIENT)
Dept: ULTRASOUND IMAGING | Facility: HOSPITAL | Age: 73
DRG: 683 | End: 2023-09-23
Attending: INTERNAL MEDICINE
Payer: COMMERCIAL

## 2023-09-23 VITALS
WEIGHT: 186.38 LBS | HEIGHT: 72 IN | OXYGEN SATURATION: 93 % | BODY MASS INDEX: 25.24 KG/M2 | RESPIRATION RATE: 18 BRPM | SYSTOLIC BLOOD PRESSURE: 149 MMHG | DIASTOLIC BLOOD PRESSURE: 63 MMHG | TEMPERATURE: 98 F | HEART RATE: 85 BPM

## 2023-09-23 PROBLEM — N17.9 AKI (ACUTE KIDNEY INJURY) (HCC): Status: ACTIVE | Noted: 2023-09-23

## 2023-09-23 PROBLEM — N17.9 AKI (ACUTE KIDNEY INJURY): Status: ACTIVE | Noted: 2023-09-23

## 2023-09-23 LAB
ALBUMIN SERPL-MCNC: 2.1 G/DL (ref 3.4–5)
ALBUMIN/GLOB SERPL: 0.6 {RATIO} (ref 1–2)
ALP LIVER SERPL-CCNC: 229 U/L
ALT SERPL-CCNC: 36 U/L
ANION GAP SERPL CALC-SCNC: 8 MMOL/L (ref 0–18)
AST SERPL-CCNC: 36 U/L (ref 15–37)
BASOPHILS # BLD AUTO: 0.03 X10(3) UL (ref 0–0.2)
BASOPHILS NFR BLD AUTO: 0.3 %
BILIRUB SERPL-MCNC: 0.4 MG/DL (ref 0.1–2)
BUN BLD-MCNC: 68 MG/DL (ref 7–18)
BUN/CREAT SERPL: 19.5 (ref 10–20)
CALCIUM BLD-MCNC: 15.2 MG/DL (ref 8.5–10.1)
CALCIUM BLD-MCNC: 15.4 MG/DL (ref 8.5–10.1)
CHLORIDE SERPL-SCNC: 99 MMOL/L (ref 98–112)
CO2 SERPL-SCNC: 26 MMOL/L (ref 21–32)
CREAT BLD-MCNC: 3.43 MG/DL
CREAT BLD-MCNC: 3.48 MG/DL
DEPRECATED RDW RBC AUTO: 37 FL (ref 35.1–46.3)
EGFRCR SERPLBLD CKD-EPI 2021: 18 ML/MIN/1.73M2 (ref 60–?)
EOSINOPHIL # BLD AUTO: 0.03 X10(3) UL (ref 0–0.7)
EOSINOPHIL NFR BLD AUTO: 0.3 %
ERYTHROCYTE [DISTWIDTH] IN BLOOD BY AUTOMATED COUNT: 12.6 % (ref 11–15)
GLOBULIN PLAS-MCNC: 3.7 G/DL (ref 2.8–4.4)
GLUCOSE BLD-MCNC: 204 MG/DL (ref 70–99)
GLUCOSE BLDC GLUCOMTR-MCNC: 138 MG/DL (ref 70–99)
GLUCOSE BLDC GLUCOMTR-MCNC: 139 MG/DL (ref 70–99)
HCT VFR BLD AUTO: 28.1 %
HGB BLD-MCNC: 9.4 G/DL
IMM GRANULOCYTES # BLD AUTO: 0.04 X10(3) UL (ref 0–1)
IMM GRANULOCYTES NFR BLD: 0.4 %
INR BLD: 1.2 (ref 0.85–1.16)
LYMPHOCYTES # BLD AUTO: 0.76 X10(3) UL (ref 1–4)
LYMPHOCYTES NFR BLD AUTO: 8 %
MAGNESIUM SERPL-MCNC: 1.8 MG/DL (ref 1.6–2.6)
MCH RBC QN AUTO: 27.1 PG (ref 26–34)
MCHC RBC AUTO-ENTMCNC: 33.5 G/DL (ref 31–37)
MCV RBC AUTO: 81 FL
MONOCYTES # BLD AUTO: 1.01 X10(3) UL (ref 0.1–1)
MONOCYTES NFR BLD AUTO: 10.6 %
NEUTROPHILS # BLD AUTO: 7.68 X10 (3) UL (ref 1.5–7.7)
NEUTROPHILS # BLD AUTO: 7.68 X10(3) UL (ref 1.5–7.7)
NEUTROPHILS NFR BLD AUTO: 80.4 %
OSMOLALITY SERPL CALC.SUM OF ELEC: 302 MOSM/KG (ref 275–295)
PHOSPHATE SERPL-MCNC: 4.2 MG/DL (ref 2.5–4.9)
PLATELET # BLD AUTO: 393 10(3)UL (ref 150–450)
POTASSIUM SERPL-SCNC: 3.8 MMOL/L (ref 3.5–5.1)
PROT SERPL-MCNC: 5.8 G/DL (ref 6.4–8.2)
PROTHROMBIN TIME: 15 SECONDS (ref 11.6–14.8)
PTH-INTACT SERPL-MCNC: 8.2 PG/ML (ref 18.5–88)
RBC # BLD AUTO: 3.47 X10(6)UL
SODIUM SERPL-SCNC: 133 MMOL/L (ref 136–145)
VIT D+METAB SERPL-MCNC: 32.2 NG/ML (ref 30–100)
WBC # BLD AUTO: 9.6 X10(3) UL (ref 4–11)

## 2023-09-23 PROCEDURE — 76770 US EXAM ABDO BACK WALL COMP: CPT | Performed by: INTERNAL MEDICINE

## 2023-09-23 PROCEDURE — 99223 1ST HOSP IP/OBS HIGH 75: CPT | Performed by: INTERNAL MEDICINE

## 2023-09-23 PROCEDURE — 99233 SBSQ HOSP IP/OBS HIGH 50: CPT | Performed by: HOSPITALIST

## 2023-09-23 RX ORDER — SODIUM CHLORIDE 9 MG/ML
100 INJECTION, SOLUTION INTRAVENOUS CONTINUOUS
Status: DISCONTINUED | OUTPATIENT
Start: 2023-09-23 | End: 2023-09-23

## 2023-09-23 RX ORDER — CLOTRIMAZOLE AND BETAMETHASONE DIPROPIONATE 10; .64 MG/G; MG/G
1 CREAM TOPICAL 2 TIMES DAILY
Status: DISCONTINUED | OUTPATIENT
Start: 2023-09-23 | End: 2023-09-23

## 2023-09-23 RX ORDER — SODIUM CHLORIDE 9 MG/ML
INJECTION, SOLUTION INTRAVENOUS CONTINUOUS
Status: ACTIVE | OUTPATIENT
Start: 2023-09-23 | End: 2023-09-23

## 2023-09-23 RX ORDER — ACETAMINOPHEN 500 MG
500 TABLET ORAL EVERY 4 HOURS PRN
Status: DISCONTINUED | OUTPATIENT
Start: 2023-09-23 | End: 2023-09-23

## 2023-09-23 RX ORDER — FUROSEMIDE 10 MG/ML
20 INJECTION INTRAMUSCULAR; INTRAVENOUS ONCE
Status: COMPLETED | OUTPATIENT
Start: 2023-09-23 | End: 2023-09-23

## 2023-09-23 RX ORDER — SODIUM CHLORIDE 9 MG/ML
INJECTION, SOLUTION INTRAVENOUS CONTINUOUS
Status: DISCONTINUED | OUTPATIENT
Start: 2023-09-23 | End: 2023-09-23

## 2023-09-23 RX ORDER — ONDANSETRON 2 MG/ML
4 INJECTION INTRAMUSCULAR; INTRAVENOUS EVERY 6 HOURS PRN
Status: DISCONTINUED | OUTPATIENT
Start: 2023-09-23 | End: 2023-09-23

## 2023-09-23 RX ORDER — HYDRALAZINE HYDROCHLORIDE 25 MG/1
25 TABLET, FILM COATED ORAL 2 TIMES DAILY
Status: DISCONTINUED | OUTPATIENT
Start: 2023-09-23 | End: 2023-09-23

## 2023-09-23 RX ORDER — ROSUVASTATIN CALCIUM 5 MG/1
5 TABLET, COATED ORAL NIGHTLY
Status: DISCONTINUED | OUTPATIENT
Start: 2023-09-23 | End: 2023-09-23

## 2023-09-23 RX ORDER — HEPARIN SODIUM 5000 [USP'U]/ML
5000 INJECTION, SOLUTION INTRAVENOUS; SUBCUTANEOUS EVERY 12 HOURS SCHEDULED
Status: DISCONTINUED | OUTPATIENT
Start: 2023-09-23 | End: 2023-09-23

## 2023-09-23 RX ORDER — ENOXAPARIN SODIUM 100 MG/ML
40 INJECTION SUBCUTANEOUS DAILY
Status: DISCONTINUED | OUTPATIENT
Start: 2023-09-23 | End: 2023-09-23 | Stop reason: ALTCHOICE

## 2023-09-23 RX ORDER — BENZONATATE 100 MG/1
200 CAPSULE ORAL 3 TIMES DAILY PRN
Status: DISCONTINUED | OUTPATIENT
Start: 2023-09-23 | End: 2023-09-23

## 2023-09-23 RX ORDER — METOCLOPRAMIDE HYDROCHLORIDE 5 MG/ML
5 INJECTION INTRAMUSCULAR; INTRAVENOUS EVERY 8 HOURS PRN
Status: DISCONTINUED | OUTPATIENT
Start: 2023-09-23 | End: 2023-09-23

## 2023-09-23 NOTE — PROGRESS NOTES
[facility] Pharmacy Note:  Renal Dose Adjustment for enoxaparin (LOVENOX)    Rob Mena II has been prescribed enoxaparin 40 mg subcutaneously every 24 hours. Estimated Creatinine Clearance: 21.7 mL/min (A) (based on SCr of 3.43 mg/dL (H)). Calculated CrCl 20 to 30 mL/min so the dose of Enoxaparin (LOVENOX) has been changed to heparin 5000 units every 12 hours per P&T approved protocol. Pharmacy will continue to follow, and make additional adjustments if needed.       Thank you,  Vivienne Harris, PharmD  9/23/2023 1:38 AM

## 2023-09-23 NOTE — PLAN OF CARE
Pt admitted from ED, complains of fatigue and weakness, BLE edema. Up 1 w/ walker, unsteady on his feet. On IV fluids. Pt denies pain. Critical Calcium - MD notified. Spouse at bedside. Per spouse- pt's daughter is an MD, is flying in from [place] today. Pt wants daughter to participate in care and be updated on care plan. Safety precaution in place, call light within reach. Problem: Patient Centered Care  Goal: Patient preferences are identified and integrated in the patient's plan of care  Description: Interventions:  - What would you like us to know as we care for you?  From home with spouse  - Provide timely, complete, and accurate information to patient/family  - Incorporate patient and family knowledge, values, beliefs, and cultural backgrounds into the planning and delivery of care  - Encourage patient/family to participate in care and decision-making at the level they choose  - Honor patient and family perspectives and choices  Outcome: Progressing     Problem: Diabetes/Glucose Control  Goal: Glucose maintained within prescribed range  Description: INTERVENTIONS:  - Monitor Blood Glucose as ordered  - Assess for signs and symptoms of hyperglycemia and hypoglycemia  - Administer ordered medications to maintain glucose within target range  - Assess barriers to adequate nutritional intake and initiate nutrition consult as needed  - Instruct patient on self management of diabetes  Outcome: Progressing     Problem: Patient/Family Goals  Goal: Patient/Family Long Term Goal  Description: Patient's Long Term Goal: go home    Interventions:  - Monitor vital signs  - Monitor appropriate labs  - Monitor blood glucose levels  - Administer medications per order  - Pain management as needed  - Follow MD orders  - Diagnostics per orders  - Update / inform patient and family on plan of care  - Discharge planning      - See additional Care Plan goals for specific interventions  Outcome: Progressing  Goal: Patient/Family Short Term Goal  Description: Patient's Short Term Goal: figure out reason for fatigue    Interventions:   - md consult  -diagnostic testing  - See additional Care Plan goals for specific interventions  Outcome: Progressing     Problem: SAFETY ADULT - FALL  Goal: Free from fall injury  Description: INTERVENTIONS:  - Assess pt frequently for physical needs  - Identify cognitive and physical deficits and behaviors that affect risk of falls.   - [place] fall precautions as indicated by assessment.  - Educate pt/family on patient safety including physical limitations  - Instruct pt to call for assistance with activity based on assessment  - Modify environment to reduce risk of injury  - Provide assistive devices as appropriate  - Consider OT/PT consult to assist with strengthening/mobility  - Encourage toileting schedule  Outcome: Progressing     Problem: PAIN - ADULT  Goal: Verbalizes/displays adequate comfort level or patient's stated pain goal  Description: INTERVENTIONS:  - Encourage pt to monitor pain and request assistance  - Assess pain using appropriate pain scale  - Administer analgesics based on type and severity of pain and evaluate response  - Implement non-pharmacological measures as appropriate and evaluate response  - Consider cultural and social influences on pain and pain management  - Manage/alleviate anxiety  - Utilize distraction and/or relaxation techniques  - Monitor for opioid side effects  - Notify MD/LIP if interventions unsuccessful or patient reports new pain  - Anticipate increased pain with activity and pre-medicate as appropriate  Outcome: Progressing     Problem: DISCHARGE PLANNING  Goal: Discharge to home or other facility with appropriate resources  Description: INTERVENTIONS:  - Identify barriers to discharge w/pt and caregiver  - Include patient/family/discharge partner in discharge planning  - Arrange for needed discharge resources and transportation as appropriate  - Identify discharge learning needs (meds, wound care, etc)  - Arrange for interpreters to assist at discharge as needed  - Consider post-discharge preferences of patient/family/discharge partner  - Complete POLST form as appropriate  - Assess patient's ability to be responsible for managing their own health  - Refer to Case Management Department for coordinating discharge planning if the patient needs post-hospital services based on physician/LIP order or complex needs related to functional status, cognitive ability or social support system  Outcome: Progressing

## 2023-09-23 NOTE — ED QUICK NOTES
Orders for admission, patient is aware of plan and ready to go upstairs. Any questions, please call ED RN Dina at [phone].      Patient Covid vaccination status: Fully vaccinated     COVID Test Ordered in ED: $$$$Respiratory Flu Expanded Panel + IMOPJ-34$$$$    COVID Suspicion at Admission: N/A    Running Infusions:  None    Mental Status/LOC at time of transport: A&Ox4/4    Other pertinent information:   CIWA score: N/A   NIH score:  N/A

## 2023-09-23 NOTE — PLAN OF CARE
Problem: Patient Centered Care  Goal: Patient preferences are identified and integrated in the patient's plan of care  Description: Interventions:  - What would you like us to know as we care for you? From home with spouse  - Provide timely, complete, and accurate information to patient/family  - Incorporate patient and family knowledge, values, beliefs, and cultural backgrounds into the planning and delivery of care  - Encourage patient/family to participate in care and decision-making at the level they choose  - Honor patient and family perspectives and choices  Outcome: Progressing     Problem: Diabetes/Glucose Control  Goal: Glucose maintained within prescribed range  Description: INTERVENTIONS:  - Monitor Blood Glucose as ordered  - Assess for signs and symptoms of hyperglycemia and hypoglycemia  - Administer ordered medications to maintain glucose within target range  - Assess barriers to adequate nutritional intake and initiate nutrition consult as needed  - Instruct patient on self management of diabetes  Outcome: Progressing     Problem: SAFETY ADULT - FALL  Goal: Free from fall injury  Description: INTERVENTIONS:  - Assess pt frequently for physical needs  - Identify cognitive and physical deficits and behaviors that affect risk of falls.   - [place] fall precautions as indicated by assessment.  - Educate pt/family on patient safety including physical limitations  - Instruct pt to call for assistance with activity based on assessment  - Modify environment to reduce risk of injury  - Provide assistive devices as appropriate  - Consider OT/PT consult to assist with strengthening/mobility  - Encourage toileting schedule  Outcome: Progressing     Problem: PAIN - ADULT  Goal: Verbalizes/displays adequate comfort level or patient's stated pain goal  Description: INTERVENTIONS:  - Encourage pt to monitor pain and request assistance  - Assess pain using appropriate pain scale  - Administer analgesics based on type and severity of pain and evaluate response  - Implement non-pharmacological measures as appropriate and evaluate response  - Consider cultural and social influences on pain and pain management  - Manage/alleviate anxiety  - Utilize distraction and/or relaxation techniques  - Monitor for opioid side effects  - Notify MD/LIP if interventions unsuccessful or patient reports new pain  - Anticipate increased pain with activity and pre-medicate as appropriate  Outcome: Progressing     Problem: DISCHARGE PLANNING  Goal: Discharge to home or other facility with appropriate resources  Description: INTERVENTIONS:  - Identify barriers to discharge w/pt and caregiver  - Include patient/family/discharge partner in discharge planning  - Arrange for needed discharge resources and transportation as appropriate  - Identify discharge learning needs (meds, wound care, etc)  - Arrange for interpreters to assist at discharge as needed  - Consider post-discharge preferences of patient/family/discharge partner  - Complete POLST form as appropriate  - Assess patient's ability to be responsible for managing their own health  - Refer to Case Management Department for coordinating discharge planning if the patient needs post-hospital services based on physician/LIP order or complex needs related to functional status, cognitive ability or social support system  Outcome: Progressing     Patient A/Ox4, resting in bed, denies pain. [place] to insert, monitor I&O. Continue IV fluids. Scheduled for US kideny/bladder. Fall precaution. Family/patient request to leave AMA, to go to [facility] notified. Call light within reach.

## 2023-09-24 LAB
ATRIAL RATE: 78 BPM
P AXIS: 37 DEGREES
P-R INTERVAL: 162 MS
Q-T INTERVAL: 286 MS
QRS DURATION: 84 MS
QTC CALCULATION (BEZET): 326 MS
R AXIS: 6 DEGREES
T AXIS: 65 DEGREES
VENTRICULAR RATE: 78 BPM

## 2023-09-24 NOTE — DISCHARGE SUMMARY
[address] [facility],  [address] Patient Status:  Inpatient    1950 MRN [id number]   Location [facility] Attending No att. providers found   Hosp Day # 1 PCP Bessie Hill MD     DATE OF ADMISSION: 2023  DATE OF DISCHARGE: 2023   DISPOSITION: AMA  CONDITION ON DISCHARGE: good    DISCHARGE DIAGNOSES:  Hypercalcemia  Acute kidney injury  Rectal bleeding  Bipolar disorder  Constipation  Diabetes  Hemorrhoids  Hypertension    HISTORY OF PRESENT ILLNESS (COPIED FROM ADMISSION H&P)  Iris Mazariegos II is a 72year old male with PMHx significant for hypertension hyperlipidemia, BPH s/p Urolift, diabetes mellitus presents today secondary to fatigue and shortness of breath starting a month ago, He is a psychologist by profession. He is been having Fatigue, poor PO , chills, intake for a month now. He recently had urolift recently as well, he was on bactrim for 3 days and it made him feel better. He also c/o chills but no fever, no abdominal pain. Denies any mental status changes other then occasional forgetfullness. Wife is accompanying him at this time. HOSPITAL COURSE:  Patient was admitted, started on aggressive IV fluids, did require 1 dose of Lasix. His calcium level decreased slightly but was still far from normal.  Patient did feel better constitutionally after hydration. His renal failure was addressed by nephrology. There was a plan for complete work-up for occult malignancy, including possible inpatient colonoscopy given development of recent rectal bleeding. Parathyroid hormone levels were suppressed, arguing against primary hyperparathyroidism. His alk phos was slightly elevated without good explanation. In the midst of work-up, patient decided to leave the hospital  [address] citing that the work-up was \"too slow\". He said he was going to [place].   I have reached out to his primary care physician to contact him to make sure that he does not fact continue seeking medical attention for this urgent issue. Patient understands return to the emergency room for increased pain, fever, discharge, shortness of breath, chest pain, new neurologic symptoms, other concerning symptoms. PHYSICAL EXAM:     Gen: A+Ox3. No distress. HEENT: NCAT, neck supple, no carotid bruit. CV: RRR, S1S2, and intact distal pulses. No gallop, rub, murmur. Pulm: Effort and breath sounds normal. No distress, wheezes, rales, rhonchi. Abd: Soft, NTND, BS normal, no mass, no HSM, no rebound/guarding. Neuro: Normal reflexes, CN. Sensory/motor exams grossly normal deficit. Coordination  and gait normal.   MS: No joint effusions. No peripheral edema. Skin: Skin is warm and dry. No rashes, erythema, diaphoresis. Psych: Normal mood and affect. Behavior and judgment normal.     DISCHARGE MEDICATIONS     Discharge Medications        ASK your doctor about these medications        Instructions Prescription details   acetaminophen 325 MG Tabs  Commonly known as: Tylenol      Take 2 tablets (650 mg total) by mouth every 6 (six) hours as needed for Pain. Quantity: 30 tablet  Refills: 0     aspirin 81 MG Tabs      Take 1 tablet (81 mg total) by mouth daily. Refills: 0     Cholecalciferol 50 MCG (2000 UT) Caps      Take 1 capsule by mouth daily. Refills: 0     Ciclopirox 8 % Soln      Apply 1 Application. topically nightly. Quantity: 1 each  Refills: 3     clotrimazole-betamethasone 1-0.05 % Crea  Commonly known as: Lotrisone      Apply 1 Application. topically 2 (two) times daily as needed. Quantity: 60 g  Refills: 2     hydrALAZINE 25 MG Tabs  Commonly known as: Apresoline      Take 1 tablet (25 mg total) by mouth 2 (two) times daily. Quantity: 180 tablet  Refills: 3     hydroCHLOROthiazide 25 MG Tabs  Commonly known as: Hydrodiuril      Take 1 tablet (25 mg total) by mouth daily.    Quantity: 90 tablet  Refills: 3     ibuprofen 600 MG Tabs  Commonly known as: Motrin  Ask about: Should I take this medication? Take 1 tablet (600 mg total) by mouth every 8 (eight) hours as needed for Pain or Fever. Stop taking on: August 22, 2023  Quantity: 30 tablet  Refills: 0     metFORMIN HCl 1000 MG Tabs  Commonly known as: GLUCOPHAGE      Take 1 tablet (1,000 mg total) by mouth 2 (two) times daily. Quantity: 180 tablet  Refills: 3     Multi-Vitamin/Minerals Tabs      Take 1 tablet by mouth daily. Refills: 0     NIFEdipine ER 90 MG Tb24  Commonly known as: ADALAT CC      Take 1 tablet (90 mg total) by mouth daily. Quantity: 90 tablet  Refills: 3     OneTouch Ultra Blue Strp      TEST SUGAR EVERY DAY   Quantity: 100 strip  Refills: 3     rosuvastatin 5 MG Tabs  Commonly known as: Crestor      Take 1 tablet (5 mg total) by mouth nightly. Quantity: 90 tablet  Refills: 3     tadalafil 5 MG Tabs  Commonly known as: CIALIS      TAKE 1 TABLET(5 MG) BY MOUTH DAILY AS NEEDED FOR ERECTILE DYSFUNCTION   Quantity: 30 tablet  Refills: 3     tadalafil 5 MG Tabs  Commonly known as: CIALIS      Take 1 tablet (5 mg total) by mouth daily as needed for Erectile Dysfunction. Quantity: 90 tablet  Refills: 3     Trulicity 9.50 XP/1.0SC Sopn  Generic drug: Dulaglutide      Inject 0.75 mg into the skin once a week. Stop taking on: October 12, 2023  Quantity: 2 mL  Refills: 0     valsartan 320 MG Tabs  Commonly known as: Diovan      Take 1 tablet (320 mg total) by mouth daily. Quantity: 90 tablet  Refills: 3              CONSULTANTS  Consultants         Provider   Role Specialty     Eloise Roberts MD  Consulting Physician NEPHROLOGY            FOLLOW UP:  No follow-up provider specified. The above plan and follow-up instructions were reviewed with the patient and they verbalized understanding and agreement. They understand to return to the emergency room for any concerning signs or symptoms.   Greater than 30 minutes spent on discharge.  -----------------------    Hospital Discharge Diagnoses:  Hypercalcemia    Lace+ Score: 70  59-90 High Risk  29-58 Medium Risk  0-28   Low Risk. TCM Follow-Up Recommendation:  LACE > 58:  High Risk of readmission after discharge from the hospital.

## 2023-09-26 LAB — VIT D 1,25 DI-OH: 187 PG/ML

## 2023-09-27 NOTE — PAYOR COMM NOTE
RECONSIDERATION REQUEST  Left AMA, still required IP care for:  Hypercalcemia  Acute kidney injury  Rectal bleeding      --------------  DISCHARGE REVIEW    Payor: [facility]  Subscriber #:  [id number]  Authorization Number: [id number]    Admit date: 23  Admit time:   1:14 AM  Discharge Date: 2023  4:10 PM     Admitting Physician: Ruth Boucher MD  Attending Physician:  No att. providers found  Primary Care Physician: Melanie Franklin MD          Discharge Summary Notes        Discharge Summary signed by Christian Blanco MD at 2023  1:52 PM       Author: Christian Blanco MD Specialty: HOSPITALIST Author Type: Physician    Filed: 2023  1:52 PM Date of Service: 2023  1:46 PM Status: Signed    : Christian Blanco MD (Physician)           [address] [place] [address],  [address] Patient Status:  Inpatient    1950 MRN [id number]   Location [address] [place] [address] Attending No att. 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 He recently had urolift recently as well, he was on bactrim for 3 days and it made him feel better. He also c/o chills but no fever, no abdominal pain. Denies any mental status changes other then occasional forgetfullness. Wife is accompanying him at this time.      HOSPITAL COURSE:  Patient was admitted, started on aggressive IV fluids, did require 1 dose of Lasix. His calcium level decreased slightly but was still far from normal.  Patient did feel better constitutionally after hydration. His renal failure was addressed by nephrology. There was a plan for complete work-up for occult malignancy, including possible inpatient colonoscopy given development of recent rectal bleeding. Parathyroid hormone levels were suppressed, arguing against primary hyperparathyroidism. His alk phos was slightly elevated without good explanation. In the midst of work-up, patient decided to leave the hospital [address] citing that the work-up was \"too slow\". [place] [facility] [place] I have reached out to his primary care physician to contact him to make sure that he does not fact continue seeking medical attention for this urgent issue. Patient understands return to the emergency room for increased pain, fever, discharge, shortness of breath, chest pain, new neurologic symptoms, other concerning symptoms. PHYSICAL EXAM:     Gen: A+Ox3. No distress. HEENT: NCAT, neck supple, no carotid bruit. CV: RRR, S1S2, and intact distal pulses. No gallop, rub, murmur. Pulm: Effort and breath sounds normal. No distress, wheezes, rales, rhonchi. Abd: Soft, NTND, BS normal, no mass, no HSM, no rebound/guarding. Neuro: Normal reflexes, CN. Sensory/motor exams grossly normal deficit. Coordination  and gait normal.   MS: No joint effusions. No peripheral edema. Skin: Skin is warm and dry. No rashes, erythema, diaphoresis. Psych: Normal mood and affect. Behavior and judgment normal.     DISCHARGE MEDICATIONS     Discharge Medications        ASK your doctor about these medications        Instructions Prescription details   acetaminophen 325 MG Tabs  Commonly known as: Tylenol      Take 2 tablets (650 mg total) by mouth every 6 (six) hours as needed for Pain.    Quantity: 30 tablet  Refills: 0     aspirin 81 MG Tabs      Take 1 tablet (81 mg total) by mouth daily. Refills: 0     Cholecalciferol 50 MCG (2000 UT) Caps      Take 1 capsule by mouth daily. Refills: 0     Ciclopirox 8 % Soln      Apply 1 Application. topically nightly. Quantity: 1 each  Refills: 3     clotrimazole-betamethasone 1-0.05 % Crea  Commonly known as: Lotrisone      Apply 1 Application. topically 2 (two) times daily as needed. Quantity: 60 g  Refills: 2     hydrALAZINE 25 MG Tabs  Commonly known as: Apresoline      Take 1 tablet (25 mg total) by mouth 2 (two) times daily. Quantity: 180 tablet  Refills: 3     hydroCHLOROthiazide 25 MG Tabs  Commonly known as: Hydrodiuril      Take 1 tablet (25 mg total) by mouth daily. Quantity: 90 tablet  Refills: 3     ibuprofen 600 MG Tabs  Commonly known as: Motrin  Ask about: Should I take this medication? Take 1 tablet (600 mg total) by mouth every 8 (eight) hours as needed for Pain or Fever. Stop taking on: August 22, 2023  Quantity: 30 tablet  Refills: 0     metFORMIN HCl 1000 MG Tabs  Commonly known as: GLUCOPHAGE      Take 1 tablet (1,000 mg total) by mouth 2 (two) times daily. Quantity: 180 tablet  Refills: 3     Multi-Vitamin/Minerals Tabs      Take 1 tablet by mouth daily. Refills: 0     NIFEdipine ER 90 MG Tb24  Commonly known as: ADALAT CC      Take 1 tablet (90 mg total) by mouth daily. Quantity: 90 tablet  Refills: 3     OneTouch Ultra Blue Strp      TEST SUGAR EVERY DAY   Quantity: 100 strip  Refills: 3     rosuvastatin 5 MG Tabs  Commonly known as: Crestor      Take 1 tablet (5 mg total) by mouth nightly. Quantity: 90 tablet  Refills: 3     tadalafil 5 MG Tabs  Commonly known as: CIALIS      TAKE 1 TABLET(5 MG) BY MOUTH DAILY AS NEEDED FOR ERECTILE DYSFUNCTION   Quantity: 30 tablet  Refills: 3     tadalafil 5 MG Tabs  Commonly known as: CIALIS      Take 1 tablet (5 mg total) by mouth daily as needed for Erectile Dysfunction.    Quantity: 90 tablet  Refills: 3     Trulicity 0.75 MG/0.5ML Sopn  Generic drug: Dulaglutide      Inject 0.75 mg into the skin once a week. Stop taking on: October 12, 2023  Quantity: 2 mL  Refills: 0     valsartan 320 MG Tabs  Commonly known as: Diovan      Take 1 tablet (320 mg total) by mouth daily. Quantity: 90 tablet  Refills: 3              CONSULTANTS  Consultants         Provider   Role Specialty     Angeles Deleon MD  Consulting Physician NEPHROLOGY            FOLLOW UP:  No follow-up provider specified. The above plan and follow-up instructions were reviewed with the patient and they verbalized understanding and agreement. They understand to return to the emergency room for any concerning signs or symptoms. Greater than 30 minutes spent on discharge.  -----------------------    Hospital Discharge Diagnoses:  Hypercalcemia    Lace+ Score: 70  59-90 High Risk  29-58 Medium Risk  0-28   Low Risk. TCM Follow-Up Recommendation:  LACE > 58:  High Risk of readmission after discharge from the hospital.    Electronically signed by Vale Miller MD on 9/24/2023  1:52 PM         REVIEWER COMMENTS

## 2023-10-18 ENCOUNTER — MED REC SCAN ONLY (OUTPATIENT)
Dept: INTERNAL MEDICINE CLINIC | Facility: CLINIC | Age: 73
End: 2023-10-18

## 2023-10-30 NOTE — TELEPHONE ENCOUNTER
We have made attempts to contact patient. No voicemail set up, unable to leave message. ULURU message    Date: 9/9/2023 Status: Signed    : Juan Carlos Estevez MD (Physician)      We will do [place]. GI RNs: Please reach out to the patient. May schedule a colonoscopy for a history of multiple colon polyps, alteration in bowel habits, fecal incontinence, rectal bleeding and anemia following a split dose TriLyte preparation and monitored anesthesia care. Hold Trulicity for 1 week preceding the procedure. The patient should also take 17 g of of MiraLAX daily for 4 days before the procedure.

## 2023-11-02 ENCOUNTER — TELEPHONE (OUTPATIENT)
Dept: GASTROENTEROLOGY | Facility: CLINIC | Age: 73
End: 2023-11-02

## 2023-11-02 NOTE — TELEPHONE ENCOUNTER
Patient outreach message received:    Recall colon in 2 years per Dr. Armand Lee. Last done:02-07-22  Next FEC:[id number]    Recall reminder letter mailed out to patient.

## 2023-12-13 DIAGNOSIS — E78.2 MIXED HYPERLIPIDEMIA: ICD-10-CM

## 2023-12-14 RX ORDER — ROSUVASTATIN CALCIUM 5 MG/1
5 TABLET, COATED ORAL NIGHTLY
Qty: 90 TABLET | Refills: 0 | Status: SHIPPED | OUTPATIENT
Start: 2023-12-14

## 2023-12-14 NOTE — TELEPHONE ENCOUNTER
LAST VISIT  WAS 9/9/23 with  TOMMY Barrera :  Reason for Visit    Fatigue    Cough    Post Nasal Drip        LAST VISIT with DR Santana was 12/15/2022 due to HTN .   Zixi message sent .     CSS=please assists for BP follow up with Dr Santana or annual physical.       Refill passed per [facility] protocol.     Requested Prescriptions   Pending Prescriptions Disp Refills    ROSUVASTATIN 5 MG Oral Tab [Pharmacy Med Name: ROSUVASTATIN 5MG TABLETS] 90 tablet 3     Sig: TAKE 1 TABLET(5 MG) BY MOUTH EVERY NIGHT       Cholesterol Medication Protocol Passed - 12/13/2023  8:12 AM        Passed - ALT in past 12 months        Passed - LDL in past 12 months        Passed - Last ALT < 80     Lab Results   Component Value Date    ALT 36 09/23/2023             Passed - Last LDL < 130     Lab Results   Component Value Date    LDL 69 12/15/2022             Passed - In person appointment or virtual visit in the past 12 mos or appointment in next 3 mos     Recent Outpatient Visits              3 months ago Type 2 diabetes mellitus with hyperglycemia, without long-term current use of insulin (HCC)    [facility] Katie Espinoza MD    Office Visit    3 months ago Resting tremor    [facility] Sierra Moon APRN    Office Visit    3 months ago Benign prostatic hyperplasia with lower urinary tract symptoms, symptom details unspecified    [facility] Tariq Teixeira MD    Office Visit    4 months ago Diabetes mellitus type 2 without retinopathy (HCC)    [facility] Teo Peguero MD    Office Visit    12 months ago Essential hypertension    [facility] Shaila Santana MD    Office Visit                                 Recent Outpatient Visits              3 months ago Type 2 diabetes mellitus with  hyperglycemia, without long-term current use of insulin (HCC)    [facility], [facility] Katie Espinoza MD    Office Visit    3 months ago Resting tremor    [facility], [facility] Sierra Moon APRN    Office Visit    3 months ago Benign prostatic hyperplasia with lower urinary tract symptoms, symptom details unspecified    [facility] Tariq Teixeira MD    Office Visit    4 months ago Diabetes mellitus type 2 without retinopathy (HCC)    [facility] Teo Peguero MD    Office Visit    12 months ago Essential hypertension    [facility] Shaila Santana MD    Office Visit

## 2023-12-15 DIAGNOSIS — I10 ESSENTIAL HYPERTENSION: ICD-10-CM

## 2023-12-15 NOTE — TELEPHONE ENCOUNTER
Please review; protocol failed.     Requested Prescriptions   Pending Prescriptions Disp Refills    NIFEDIPINE ER 90 MG Oral Tablet 24 Hr [Pharmacy Med Name: NIFEDIPINE 90MG ER (CC) TABLETS] 90 tablet 3     Sig: TAKE 1 TABLET(90 MG) BY MOUTH DAILY       Hypertensive Medications Protocol Failed - 12/15/2023  8:14 AM        Failed - Last BP reading less than 140/90     BP Readings from Last 1 Encounters:   09/23/23 149/63               Failed - EGFRCR or GFRNAA > 50     GFR Evaluation  EGFRCR: 18 , resulted on 9/23/2023          Passed - In person appointment in the past 12 or next 3 months     Recent Outpatient Visits              3 months ago Type 2 diabetes mellitus with hyperglycemia, without long-term current use of insulin ([facility]    [address] [facility], Ramon Martinez MD    Office Visit    3 months ago Resting tremor    [address] [facility] STEPHANIE Nunez    Office Visit    3 months ago Benign prostatic hyperplasia with lower urinary tract symptoms, symptom details unspecified    Aaron Rubio MD    Office Visit    4 months ago Diabetes mellitus type 2 without retinopathy ([facility]    Keely Rubio MD    Office Visit    1 year ago Essential hypertension    Chaka Blank Renee Me, MD    Office Visit                      Passed - CMP or BMP in past 6 months     Recent Results (from the past 4392 hour(s))   Comp Metabolic Panel (14)    Collection Time: 09/23/23  6:00 AM   Result Value Ref Range    Glucose 204 (H) 70 - 99 mg/dL    Sodium 133 (L) 136 - 145 mmol/L    Potassium 3.8 3.5 - 5.1 mmol/L    Chloride 99 98 - 112 mmol/L    CO2 26.0 21.0 - 32.0 mmol/L    Anion Gap 8 0 - 18 mmol/L    BUN 68 (H) 7 - 18 mg/dL    Creatinine 3.48 (H) 0.70 - 1.30 mg/dL    BUN/CREA Ratio 19.5 10.0 - 20.0    Calcium, Total 15.2 (HH) 8.5 - 10.1 mg/dL    Calculated Osmolality 302 (H) 275 - 295 mOsm/kg    eGFR-Cr 18 (L) >=60 mL/min/1.73m2    ALT 36 16 - 61 U/L    AST 36 15 - 37 U/L    Alkaline Phosphatase 229 (H) 45 - 117 U/L    Bilirubin, Total 0.4 0.1 - 2.0 mg/dL    Total Protein 5.8 (L) 6.4 - 8.2 g/dL    Albumin 2.1 (L) 3.4 - 5.0 g/dL    Globulin  3.7 2.8 - 4.4 g/dL    A/G Ratio 0.6 (L) 1.0 - 2.0     *Note: Due to a large number of results and/or encounters for the requested time period, some results have not been displayed. A complete set of results can be found in Results Review.                Passed - In person appointment or virtual visit in the past 6 months     Recent Outpatient Visits              3 months ago Type 2 diabetes mellitus with hyperglycemia, without long-term current use of insulin [facility])    [facility], [address] [place] [address], MD    Office Visit    3 months ago Resting tremor    [address] [facility],[address], [address] [facility], [address] [place] [address],[address], APRN    Office Visit    3 months ago Benign prostatic hyperplasia with lower urinary tract symptoms, symptom details unspecified    Huyen Mcmillan MD    Office Visit    4 months ago Diabetes mellitus type 2 without retinopathy ([facility] [facility]    Mihir Mcmillan MD    Office Visit    1 year ago Essential hypertension    Sejal Marie MD    Office Visit                           Recent Outpatient Visits              3 months ago Type 2 diabetes mellitus with hyperglycemia, without long-term current use of insulin [facility])    Austin Teague MD    Office Visit    3 months ago Resting tremor    Reyes Choi, [address] [facility],[address], APRN    Office Visit    3 months ago Benign prostatic hyperplasia with lower urinary tract symptoms, symptom details unspecified    Abhi Almanzar MD    Office Visit    4 months ago Diabetes mellitus type 2 without retinopathy [facility])    [address] [place] [address], Hudson Martinez MD    Office Visit    1 year ago Essential hypertension    Nelma Eisenmenger, Juanjo Fernández MD    Office Visit

## 2023-12-16 DIAGNOSIS — E11.9 DIABETES MELLITUS TYPE 2 WITHOUT RETINOPATHY (HCC): ICD-10-CM

## 2023-12-16 RX ORDER — NIFEDIPINE 90 MG/1
90 TABLET, FILM COATED, EXTENDED RELEASE ORAL DAILY
Qty: 90 TABLET | Refills: 0 | Status: SHIPPED | OUTPATIENT
Start: 2023-12-16

## 2023-12-18 NOTE — TELEPHONE ENCOUNTER
Please review; protocol failed.    Patient has labs done at [facility] on 10/2023  Requested Prescriptions   Pending Prescriptions Disp Refills    METFORMIN HCL 1000 MG Oral Tab [Pharmacy Med Name: METFORMIN 1000MG TABLETS] 180 tablet 3     Sig: TAKE 1 TABLET(1000 MG) BY MOUTH TWICE DAILY       Diabetes Medication Protocol Failed - 12/16/2023 10:38 AM        Failed - Last A1C < 7.5 and within past 6 months     Lab Results   Component Value Date    A1C 7.9 (H) 09/09/2023             Failed - EGFRCR or GFRNAA > 50     GFR Evaluation  EGFRCR: 18 , resulted on 9/23/2023          Passed - In person appointment or virtual visit in the past 6 mos or appointment in next 3 mos     Recent Outpatient Visits              3 months ago Type 2 diabetes mellitus with hyperglycemia, without long-term current use of insulin [facility])    Chichi Gann MD    Office Visit    3 months ago Resting tremor    [address], [address] [place] [address], [address] [place] [address],[address], [facility]    Office Visit    3 months ago Benign prostatic hyperplasia with lower urinary tract symptoms, symptom details unspecified    [address], Emely Mata MD    Office Visit    4 months ago Diabetes mellitus type 2 without retinopathy ([address]    [address] [facility], Arnold Terrazas MD    Office Visit    1 year ago Essential hypertension    Monster Mcnamara MD    Office Visit                      Passed - GFR in the past 12 months           Recent Outpatient Visits              3 months ago Type 2 diabetes mellitus with hyperglycemia, without long-term current use of insulin [facility])    Chichi Gann MD    Office Visit    3 months ago Resting tremor    [address], [facility] Ortiz Ko, STEPHANIE    Office Visit    3 months ago Benign prostatic hyperplasia with lower urinary tract symptoms, symptom details unspecified    Jamilah Reynoso MD    Office Visit    4 months ago Diabetes mellitus type 2 without retinopathy [facility])    Kalia Reynoso MD    Office Visit    1 year ago Essential hypertension    Yumiko November, Sommer Fan MD    Office Visit

## 2023-12-20 NOTE — TELEPHONE ENCOUNTER
2nd attempt/not able to leave a voice mail box message. Message states that the patient has a voice mail box that has not been set up yet.

## 2024-06-17 ENCOUNTER — TELEPHONE (OUTPATIENT)
Dept: SURGERY | Facility: CLINIC | Age: 74
End: 2024-06-17

## 2024-06-17 NOTE — TELEPHONE ENCOUNTER
Patient requesting assistance disputing prior authorization for Cystoscopy procedure completed on 9/6/2023. Per Patient his insurance says it was not medically necessary. Patient has not been billed yet through his insurance, patient has bcbs, ppo and Medicare. Their assessment was that the medication was working. Patient thought it was already pre-approved and absolutely necessary. Please contact patient at [phone],thanks.

## 2024-09-23 NOTE — TELEPHONE ENCOUNTER
Needs to est with new MD. Jason Ann, please call pt and schedule appt. Thanks.     Hypertensive Medications;  Protocol Criteria:  · Appointment scheduled in the past 6 months or in the next 3 months  · BMP or CMP in the past 12 months  · Creatinine result < 2  Re
Pt has appt on 10/14
no

## 2024-11-17 NOTE — TELEPHONE ENCOUNTER
Unable to leave voicemail for patient to schedule genetic consult
Accompanied by mother acting age appropriate c/o RUQ pain s/p fall off swing this afternoon. Sent from  for r/o internal lac due to tenderness.

## 2025-03-18 ENCOUNTER — TELEPHONE (OUTPATIENT)
Dept: INTERNAL MEDICINE CLINIC | Facility: CLINIC | Age: 75
End: 2025-03-18

## 2025-03-18 NOTE — TELEPHONE ENCOUNTER
We have to call him and confirm - he was brandy villaseñor hlymphoma and went to [facility] for treatment so may be seeing all his drs there now --pls check with him

## 2025-03-19 NOTE — TELEPHONE ENCOUNTER
1st attempt - tried to contact patient but voice mail is not yet set up. We will try again next time.

## 2025-03-21 NOTE — TELEPHONE ENCOUNTER
2nd attempt- tried to contact patient again but voice mail have not yet set up (ROSA M) on file has the same phone#.  Please send patient a letter. Thank you.

## 2025-07-13 NOTE — TELEPHONE ENCOUNTER
H&P - Hospitalist   Name: Lobo Don 75 y.o. male I MRN: [id number]  Unit/Bed#: ED 12 I Date of Admission: 7/13/2025   Date of Service: 7/13/2025 I Hospital Day: 0     Assessment & Plan  SIRS (systemic inflammatory response syndrome) (HCC)    Presented with leukocytosis of 15K, tachypnea  Also reports chills but no fevers  Was noted to have significantly elevated Pro-Kenneth  CT chest abdomen pelvis revealed no acute abnormalities  Was provided a dose of Zosyn in the emergency department  Will monitor off antibiotics  Monitor fever curve  Follow-up blood cultures  Hypertension  Hold home BP meds entirely at this time given hypotension on arrival  Blood pressure has since improved  Hold hydralazine, beta-blocker, HCTZ, enalapril  Hyperlipidemia  Not on statin  Outpatient follow-up  Benign prostatic hyperplasia with urinary frequency  Continue Flomax, Proscar  Obesity (BMI 30-39.9)  Diet and exercise counseling provided  Constipation  No bowel movements for 4 days  Started on senna/Colace and MiraLAX      VTE Pharmacologic Prophylaxis:   Low Risk (Score 0-2) - Encourage Ambulation.  Code Status:  full code  Discussion with family: Patient declined call to .     Anticipated Length of Stay: Patient will be admitted on an inpatient basis with an anticipated length of stay of greater than 2 midnights secondary to SIRS.    History of Present Illness   Chief Complaint: Abdominal discomfort    Lobo Don is a 75 y.o. male with past medical history significant hypertension, BPH initially presented with abdominal discomfort and associated nausea.  Reports generalized abdominal pain.  Denies fevers.  Reports chills.  Denies cough, shortness of breath, chest pain, palpitations, headache or any other complaints  Review of Systems   Constitutional:  Negative for appetite change, chills, diaphoresis, fatigue, fever and unexpected weight change.   HENT:  Negative for congestion, rhinorrhea and sore throat.   NEEDS NEW SCRIPT FOR [facility] [place]  Current Outpatient Medications:   •  ROSUVASTATIN CALCIUM 5 MG Oral Tab, TAKE 1 TABLET BY MOUTH EVERY DAY EVERY NIGHT, Disp: 90 tablet, Rfl: 1  •   Eyes:  Negative for photophobia and visual disturbance.   Respiratory:  Negative for cough, shortness of breath and wheezing.    Cardiovascular:  Negative for chest pain, palpitations and leg swelling.   Gastrointestinal:  Positive for abdominal pain, nausea and vomiting. Negative for anal bleeding, blood in stool, constipation and diarrhea.   Genitourinary:  Negative for decreased urine volume, difficulty urinating, dysuria, flank pain, frequency, hematuria and urgency.   Musculoskeletal:  Negative for arthralgias, back pain, joint swelling and myalgias.   Skin:  Negative for color change and rash.   Neurological:  Negative for dizziness, seizures, facial asymmetry, speech difficulty, numbness and headaches.   Psychiatric/Behavioral:  Negative for agitation, confusion and decreased concentration. The patient is not nervous/anxious.        Historical Information   Past Medical History[1]  Past Surgical History[2]  Social History[3]  E-Cigarette/Vaping    E-Cigarette Use Never User      E-Cigarette/Vaping Substances    Nicotine No     THC No     CBD No     Flavoring No     Other No     Unknown No      Family History[4]  Social History:  Marital Status: /Civil Union     Patient Pre-hospital Living Situation: Home  Patient Pre-hospital Level of Mobility: walks  Patient Pre-hospital Diet Restrictions: none    Meds/Allergies   I have reviewed home medications with patient personally.  Prior to Admission medications    Medication Sig Start Date End Date Taking? Authorizing Provider   allopurinol (ZYLOPRIM) 100 mg tablet Take 4 tablets (400 mg total) by mouth daily Taking 4 tablets a day 400mg 5/7/25  Yes Talya Moctezuma DO   enalapril (VASOTEC) 20 mg tablet Take 1 tablet (20 mg total) by mouth daily 2/4/25  Yes Yoan Toribio MD   ferrous sulfate 325 (65 Fe) mg tablet Take 325 mg by mouth every other day   Yes Historical Provider, MD   finasteride (PROSCAR) 5 mg tablet Take 1 tablet (5 mg total) by mouth daily  4/13/25  Yes Yoan Toribio MD   hydrALAZINE (APRESOLINE) 50 mg tablet Take 1.5 tablets (75 mg total) by mouth 3 (three) times a day 9/28/24  Yes Yoan Toribio MD   hydroCHLOROthiazide 25 mg tablet TAKE 1 TABLET (25 MG TOTAL) BY MOUTH DAILY. 4/19/25  Yes Yoan Toribio MD   metoprolol succinate (TOPROL-XL) 100 mg 24 hr tablet TAKE 1 TABLET BY MOUTH EVERY DAY 6/4/25  Yes Yoan Toribio MD   oxyCODONE-acetaminophen (PERCOCET) 5-325 mg per tablet Take 1 tablet by mouth every 6 (six) hours as needed for severe pain (back/leg pain/ongoing rx) Label no driving no etoh. Initial rx.  Dx: Max Daily Amount: 4 tablets 7/9/25  Yes Yoan Toribio MD   tamsulosin (FLOMAX) 0.4 mg TAKE 1 CAPSULE BY MOUTH EVERY DAY 4/20/25  Yes Yoan Toribio MD   acetaminophen (TYLENOL) 650 mg CR tablet Take 250 mg by mouth every 8 (eight) hours as needed for mild pain 1500    Historical Provider, MD   colchicine (COLCRYS) 0.6 mg tablet Take 1 tablet (0.6 mg total) by mouth daily 6/9/25   Talya Moctezuma, DO     Allergies   Allergen Reactions    Lorazepam Other (See Comments)     very agitated  Psychosis       Objective :  Temp:  [98 °F (36.7 °C)] 98 °F (36.7 °C)  HR:  [72-81] 72  BP: ()/(54-78) 103/59  Resp:  [14-25] 17  SpO2:  [92 %-99 %] 96 %  O2 Device: None (Room air)    Physical Exam  Constitutional:       General: He is not in acute distress.     Appearance: He is well-developed. He is not diaphoretic.   HENT:      Head: Normocephalic and atraumatic.      Nose: Nose normal.      Mouth/Throat:      Pharynx: No oropharyngeal exudate.     Eyes:      General: No scleral icterus.     Conjunctiva/sclera: Conjunctivae normal.       Cardiovascular:      Rate and Rhythm: Normal rate and regular rhythm.      Heart sounds: Normal heart sounds. No murmur heard.     No friction rub. No gallop.   Pulmonary:      Effort: Pulmonary effort is normal. No respiratory distress.      Breath sounds: Normal breath sounds. No wheezing or rales.   Chest:      Chest wall:  No tenderness.   Abdominal:      General: Bowel sounds are normal. There is no distension.      Palpations: Abdomen is soft.      Tenderness: There is no abdominal tenderness. There is no guarding.     Musculoskeletal:         General: No tenderness or deformity. Normal range of motion.      Cervical back: Normal range of motion and neck supple.     Skin:     General: Skin is warm and dry.      Findings: No erythema.     Neurological:      Mental Status: He is alert. Mental status is at baseline.          Lines/Drains:            Lab Results: I have reviewed the following results:  Results from last 7 days   Lab Units 07/13/25  0552   WBC Thousand/uL 15.25*   HEMOGLOBIN g/dL 13.4   HEMATOCRIT % 39.1   PLATELETS Thousands/uL 250   SEGS PCT % 82*   LYMPHO PCT % 7*   MONO PCT % 10   EOS PCT % 1     Results from last 7 days   Lab Units 07/13/25  0552   SODIUM mmol/L 137   POTASSIUM mmol/L 3.7   CHLORIDE mmol/L 99   CO2 mmol/L 27   BUN mg/dL 33*   CREATININE mg/dL 1.27   ANION GAP mmol/L 11   CALCIUM mg/dL 9.2   ALBUMIN g/dL 3.8   TOTAL BILIRUBIN mg/dL 0.83   ALK PHOS U/L 88   ALT U/L 19   AST U/L 19   GLUCOSE RANDOM mg/dL 113     Results from last 7 days   Lab Units 07/13/25  0643   INR  1.20*         Lab Results   Component Value Date    HGBA1C 5.0 12/18/2019    HGBA1C 4.9 06/18/2019     Results from last 7 days   Lab Units 07/13/25  0643 07/13/25  0625   LACTIC ACID mmol/L  --  1.0   PROCALCITONIN ng/ml 18.73*  --        Imaging Results Review: I personally reviewed the following image studies/reports in PACS and discussed pertinent findings with Radiology: chest xray. My interpretation of the radiology images/reports is:  .  Other Study Results Review: EKG was reviewed.     Administrative Statements   I have spent a total time of 76 minutes in caring for this patient on the day of the visit/encounter including Diagnostic results.    ** Please Note: This note has been constructed using a voice recognition system.  **         [1]   Past Medical History:  Diagnosis Date    Anemia October 2023    Aneurysm (HCC) March 2018    Aortic aneurysm (HCC)     Arthritis August 2020    BPH (benign prostatic hyperplasia)     Diverticulitis     Recurrent - 1990s    Diverticulitis of colon Early 1990's    Diverticulosis 1996    Eczema Early Childhood    Elevated troponin     Gout 2010    Heart attack (HCC) 2018    Heart disease     History of colon polyps     Onset 6/30/89    History of colonoscopy     6/5/13    History of esophagogastroduodenoscopy     6/5/13    History of rectal polyps     Onset 6/30/89    Hypertension     Hypokalemia     Liver disease October 2023    Liver Abscess    Morbid obesity (HCC)     Myocardial infarction (HCC)     Obesity Early 1990's    Osteoarthritis July 2024    Pneumonia February 2018    Renal calculi     Seasonal allergies     Sleep apnea with use of continuous positive airway pressure (CPAP)     STEMI (ST elevation myocardial infarction) (HCC)     ((Pt Qnr Sub: na))    [2]   Past Surgical History:  Procedure Laterality Date    ABDOMINAL SURGERY  1996 /1997    Dr. Gonzalez    APPENDECTOMY  1974    ARTHROSCOPY KNEE Left     BOWEL RESECTION  1996    COLON SURGERY  1996    Sigmoid colectomy for diverticulitis    COLONOSCOPY  06/05/2013    COLONOSCOPY  Every 5 years    Dr. Gonzalez    COLOSTOMY      CYST REMOVAL  2008    ENDOSCOPIC ULTRASOUND (UPPER)  Scheduled March 2024    FLEXIBLE SIGMOIDOSCOPY  11/02/1996    HERNIA REPAIR      ((Pt Qnr Sub: na))     IR DRAINAGE TUBE CHECK/CHANGE/REPOSITION/REINSERTION/UPSIZE  10/30/2023    IR DRAINAGE TUBE PLACEMENT  10/16/2023    JOINT REPLACEMENT  Hips in 2014 & 2016    Dr. Librado Cooper    KNEE SURGERY  Early 2010's    Dr. 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 secondary to enterolysis of adhesions    REVISION COLOSTOMY  02/24/1997    ROTATOR CUFF REPAIR Left     2004 &  2009    SATURATION BIOPSY OF PROSTATE      SHOULDER SURGERY  2001    Benign lump    TONSILLECTOMY  1959    TOTAL HIP ARTHROPLASTY Bilateral     2014 - L, 2016 - R / Last Assessed:5/8/15   [3]   Social History  Tobacco Use    Smoking status: Never    Smokeless tobacco: Never   Vaping Use    Vaping status: Never Used   Substance and Sexual Activity    Alcohol use: Not Currently     Comment: social    Drug use: No    Sexual activity: Not Currently     Partners: Female     Birth control/protection: None   [4]   Family History  Problem Relation Name Age of Onset    Depression Mother Jyothi Don     COPD Mother Jyothi Don     Breast cancer Mother Jyothi Don         malignant neoplasm    Lung cancer Mother Jyothi Don     Alcohol abuse Mother Jyothi Don     Mental illness Mother Jyothi Don     Cancer Mother Jyothi Don     Arthritis Mother Jyothi Don     Substance Abuse Mother Jyothi Don     Prostate cancer Father Lobo Sr     Colon polyps Father Lobo Sr     Hypertension Father Lobo Sr     Gout Father Lobo Sr     Colon cancer Brother      Diabetes Family Grandfather     Colon cancer Family Uncle     Heart attack Maternal Grandmother Jyothi     Diabetes Paternal Grandfather Lobo Don     Heart disease Paternal Grandmother Jyothi Florencia     Drug abuse Brother Valerio Florencia     Substance Abuse Brother Valerio Don     Colon cancer Brother Valerio Don     Colon cancer Maternal Uncle Jae Cheneysayra

## (undated) DIAGNOSIS — I10 PRIMARY HYPERTENSION: ICD-10-CM

## (undated) DIAGNOSIS — I10 ESSENTIAL HYPERTENSION: ICD-10-CM

## (undated) DIAGNOSIS — D12.6 COLON ADENOMAS: Primary | ICD-10-CM

## (undated) DEVICE — KIT CLEAN ENDOKIT 1.1OZ GOWNX2

## (undated) DEVICE — SNARE CAPTIFLEX MICRO-OVL OLY

## (undated) DEVICE — 35 ML SYRINGE REGULAR TIP: Brand: MONOJECT

## (undated) DEVICE — MEDI-VAC NON-CONDUCTIVE SUCTION TUBING 6MM X 1.8M (6FT.) L: Brand: CARDINAL HEALTH

## (undated) DEVICE — SNARE OPTMZ PLPCTM TRP

## (undated) DEVICE — LINE MNTR ADLT SET O2 INTMD

## (undated) DEVICE — KIT ENDO ORCAPOD 160/180/190

## (undated) NOTE — LETTER
2/9/2019          To Whom It May Concern:    Rai Henley II is currently under my medical care and may not return to work at this time. Please excuse Rob for 9 days. He may return to work on 2/11/19. Activity is restricted as follows: none.     I

## (undated) NOTE — LETTER
[address], [place]  Authorization for Invasive Procedures  1. I hereby authorize Dr. Giovany Meyer , my physician and whomever may be designated as the doctor's assistant, to perform the following operation and/or procedure:  Colonoscopy on Rob V Miranti II at [facility].    2. My physician has explained to me the nature and purpose of the operation or other procedure, possible alternative methods of treatment, the risks involved and the possibility of complications to me. I understand the probable consequences of declining the recommended procedure and the alternative methods of treatment. I acknowledge that no guarantee has been made as to the result that may be obtained. 3. I recognize that during the course of this operation or other procedure, unforeseen conditions may necessitate additional or different procedures than those listed above. I, therefore, further authorize and request that the above-named physician, his/her physician assistants, or designees perform such procedures as are, in his/her professional opinion, necessary and desirable. If I have a Do Not Attempt Resuscitation (DNAR) order in place, that status will be suspended while in the operating room, procedural suite, and during the recovery period unless otherwise explicitly stated by me (or a person authorized to consent on my behalf). The surgeon or my attending physician will determine when the applicable recovery period ends for purposes of reinstating the DNAR order. 4. Should the need arise during my operation or immediate post-operative period; I also consent to the administration of blood and/or blood products.  Further, I understand that despite careful testing and screening of blood and blood products, I may still be subject to ill effects as a result of recieving a blood transfusion an/or blood producst. The following are some, but not all, of the potential risks that can occur: fever and allergic reactions, hemolytic reactions, transmission of disease such as hepatitis, AIDS, cytomegalovirus (CMV), and flluid overload. In the event that I wish to have autologous transfusions of my own blood, or a directed donor transfusion, I will discuss this with my physician. 5. I consent to the photographing of the operations or procedures to be performed for the purposes of advancing medicine, science, and/or education, provided my identity is not revealed. If the procedure has been videotaped, the physician/surgeon will obtain the original videotape. The hospital will not be responsible for storage or maintenance of this tape. 6. I consent to the presence of a  or observer as deemed necessary by my physician or his designee. 7. Any tissues or organs removed in the operation or other procedure may be disposed of by and at the discretion of [facility].    8. I understand that the physician and his/her physician assistants may not be employees or agents of [facility], [facility], [address], but are independent medical practitioners who have been permitted to use its facilities for the care and treatment of their patients. 9. Patients having a sterilization procedure: I understand that if the procedure is successful the results will be permanent and it will therefore be impossible for me to inseminate, conceive or bear children. I also understand that the procedure is intended to result in sterility, although the result has not been guaranteed. 10. I CERTIFY THAT I HAVE READ AND FULLY UNDERSTAND THE ABOVE CONSENT TO OPERATION and/or OTHER PROCEDURE. 11. I acknowledge that my physician has explained sedation/analgesia administration to me including the risks and benefits. I consent to the administration of sedation/analgesia as may be necessary or desirable in the judgment of my physician. Signature of Patient:  ________________________________________________ Date: _________Time: _________    Responsible person in case of minor or unconscious: _____________________________Relationship: ____________     Witness Signature: ____________________________________________ Date: __________ Time: ___________    Statement of Physician  My signature below affirms that prior to the time of the procedure, I have explained to the patient and/or his legal representative, the risks and benefits involved in the proposed treatment and any reasonable alternative to the proposed treatment. I have also explained the risks and benefits involved in the refusal of the proposed treatment and have answered the patient's questions. If I have a significant financial interest in this procedure/surgery, I have disclosed this and had a discussion with my patient.   Signature of Physician:   ________________________________________Date: _________Time:_______ Patient Name: Neida Diaz  : 1950   Printed: 2022    Medical Record #: [id number]

## (undated) NOTE — LETTER
November 16, 2021         Gaurav Xie, [address] [facility]      Patient: Denver Lawrence II   YOB: 1950   Date of Visit: 11/16/2021       Dear Dr. Corby Beckford MD,    I saw your patient, Iban Talbot, on 11/16/2021.

## (undated) NOTE — LETTER
[facility]  Administration of Anesthesia  1. I, [address] [place] [address], or _________________________________ acting on his behalf, (Patient) (Dependent/Representative) request to receive anesthesia for my pending procedure/operation/treatment. A physician (anesthesiologist) alone or an anesthesiologist working with a nurse anesthetist may administer my anesthesia. 2. I understand that my anesthesiologist is not an employee or agent of the hospital, but is an independent medical practitioner who has been permitted to use its facilities for the care and treatment of his/her patients. 3. I acknowledge that a physician from [facility] or their designate(s), recommended anesthesia for me using her/his medical judgment. The type(s) of anesthesia I may receive include:                a) General Anesthesia, b) Spinal/Epidural Anesthesia, c) Regional Anesthesia or d) Monitored Anesthesia Care. 4. If my spinal, regional or monitored anesthesia care (local) is not satisfactory for my comfort, or if my medical condition requires, I consent to the administration of general anesthesia. 5. I am aware that the practice of anesthesiology is not an exact science and that some foreseeable risks or consequences may occur. Some common risks/consequences include sore throat and hoarseness, nausea and vomiting, muscle soreness, backache, damage to the mouth/teeth/vocal cords and eye injury. I understand that more rare but serious potential risks of anesthesia include blood pressure changes, drug reactions, cardiac arrest, brain damage, paralysis or death. These risks apply to whether I have general, spinal/epidural, regional or monitored anesthesia care. 6. OBSTETRIC PATIENTS: Specific risks/consequences of spinal/epidural anesthesia may include itching, low blood pressure, difficulty urinating, slowing of the baby's heart rate and headache.  Rare risks include infections, high spinal block, spinal bleeding, seizure, cardiac arrest and death. 7. AWARENESS: I understand that it is possible (but unlikely) to have explicit memory of events from the operating room while under general anesthesia. 8. ELECTROCONVULSIVE THERAPY PATIENTS: This consent serves for all treatments in a single course of therapy. 9. I understand that I must inform my anesthesiologist when I last ate and/or drank to minimize the risk of anesthesia. 10. If I am pregnant, or may pregnant, I understand that elective surgery should be postponed until after the baby is born. Anesthetics cross the placenta and may temporarily anesthetize the baby. Although fetal complications of anesthesia during pregnancy are rare, they may include birth defects, premature labor, brain damage and death. 11. I certify that I informed the anesthesiologist, to the best of my ability, about medication I take including blood thinners, anticoagulants, herbal remedies, narcotics and recreational drugs (e.g. cocaine, marijuana, PCP). Failure to inform my anesthesiologist about these medicines may increase my risk of anesthetic complications. The nature and purpose of my anesthetic management was explained to me. I had the opportunity to ask questions and the answers and information provided meet my satisfaction.   I retain the right to withdraw this consent at any time prior to the administration of said anesthetic.    ___________________________________________________           _____________________________________________________  Patient Signature                                                                                      Witness Signature                ___________________________________________________           _____________________________________________________  Date/Time                                                                                               Responsible person in case of minor/ unconscious pt /Relationship    My signature below affirms that prior to the time of the procedure, I have explained to the patient and/or his/her guardian, the risks and benefits of undergoing anesthesia, as well as any reasonable alternatives.     ___________________________________________________            _____________________________________________________  Physician Signature                            Date/Time  Patient Name: Andrew Phillips     : 1950     Printed: 2022      Medical Record #: [id number]                              Page 1 of 1    ----------ANESTHESIA CONSENT----------

## (undated) NOTE — LETTER
October 19, 2021    Cande Ojedachuy, [address] [facility]     Patient: Lilo Wahsburn II   YOB: 1950   Date of Visit: 10/19/2021       Dear Dr. Antoine Ramirez MD:    Thank you for referring Madonna Hauser to me for evaluation.  Here Outpatient Medications   Medication Sig Dispense Refill   • valsartan 320 MG Oral Tab Take 1 tablet (320 mg total) by mouth daily. 90 tablet 1   • NIFEdipine ER 60 MG Oral Tablet 24 Hr Take 1 tablet (60 mg total) by mouth daily.  90 tablet 0   • PEG 3350-GLEN Visual Fields       Left Right     Full Full          Extraocular Movement       Right Left     Full, Ortho Full, Ortho          Dilation     Both eyes: 1.0% Mydriacyl and 2.5% Nishant Synephrine @ 1:33 PM            Slit Lamp and Fundus Exam     Slit you have questions, please do not hesitate to call me. I look forward to following Shawn Herrera along with you.     Sincerely,        Cornell Magana MD        CC: No Recipients    Document electronically generated by: Cornell Magana MD

## (undated) NOTE — LETTER
03/26/18        TOM Stanton II  [address] [facility]      Dear S,    Our records indicate that you have outstanding lab work and or testing that was ordered for you and has not yet been completed:          Hemoglobin A1C [E]      C

## (undated) NOTE — LETTER
8/13/2020              [address]        [place]         Dear Elan Gutierrez,    This letter is to inform you that our office has made several attempts to reach you by phone without success.   We were attempting to conta

## (undated) NOTE — LETTER
11/2/2023    [place] [address]            Dear Heraclio Farmer II,      Our records indicate that you are due for an appointment for a Colonoscopy with Hoang Schultz MD. Our doctors are booking out about 3-5 months in advance for procedures. Please call our office to schedule a phone screening appointment to plan for the procedure(s). Your medical well-being is important to us. If your insurance requires a referral, please call your primary care office to request one.       Thank you,      The Physicians and Staff at [facility]

## (undated) NOTE — LETTER
05/13/19        [address]      Dear Jean-Claude Cronin,    Our records indicate that you have outstanding lab work and or testing that was ordered for you and has not yet been completed:  Orders Placed This Encounter

## (undated) NOTE — LETTER
7/12/2021    Nina Pedro II        [address] [facility]            Dear Nina Pedro II,      Our records indicate that you are due for an appointment for a Colonoscopy in October 2021, or shortly there after, with Ivelisse Quinn
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
## (undated) NOTE — Clinical Note
Hi Dr. Dariusz Paris, pt was due to have repeat colonoscopy 2/2024 but now having change in bowel habits, diarrhea, occ incontinence, blood in the underwear and a new mild anemia.  Sent GI referral. Thank you

## (undated) NOTE — LETTER
February 19, 2018    Alvin Del Rosario  [address] [place] [address]    Dear S:  It was a pleasure speaking with you over the phone recently.  To follow up, I wanted to send you some contact information to utilize when you have a question an

## (undated) NOTE — LETTER
Date & Time: 8/15/2023, 12:30 AM  Patient: Laila Odom II  Encounter Provider(s):    Nickie Kay MD       To Whom It May Concern:    Eliseo Park was seen and treated in our department on 8/14/2023. He should not return to work until 8/17/23 .     If you have any questions or concerns, please do not hesitate to call.        _____________________________  Physician/APC Signature

## (undated) NOTE — LETTER
05/31/18        [address]      Dear Rosana Lemos records indicate that you have outstanding lab work and or testing that was ordered for you and has not yet been completed:          Hemoglobin A1C [E]